# Patient Record
Sex: MALE | Race: WHITE | URBAN - METROPOLITAN AREA
[De-identification: names, ages, dates, MRNs, and addresses within clinical notes are randomized per-mention and may not be internally consistent; named-entity substitution may affect disease eponyms.]

---

## 2019-02-20 ENCOUNTER — OFFICE VISIT (OUTPATIENT)
Dept: FAMILY MEDICINE CLINIC | Facility: CLINIC | Age: 39
End: 2019-02-20
Payer: COMMERCIAL

## 2019-02-20 VITALS
BODY MASS INDEX: 30.87 KG/M2 | WEIGHT: 240.5 LBS | HEART RATE: 76 BPM | DIASTOLIC BLOOD PRESSURE: 70 MMHG | HEIGHT: 74.2 IN | TEMPERATURE: 99 F | SYSTOLIC BLOOD PRESSURE: 102 MMHG

## 2019-02-20 DIAGNOSIS — Z00.00 ROUTINE GENERAL MEDICAL EXAMINATION AT A HEALTH CARE FACILITY: Primary | ICD-10-CM

## 2019-02-20 PROCEDURE — 99386 PREV VISIT NEW AGE 40-64: CPT | Performed by: FAMILY MEDICINE

## 2019-02-20 RX ORDER — CALCIUM CARBONATE 200(500)MG
1 TABLET,CHEWABLE ORAL NIGHTLY
COMMUNITY

## 2019-02-20 NOTE — PROGRESS NOTES
Linda Shore is a 34year old male who is here for Patient presents with:   Well Adult      HPI:     Here for wellness    Takes tums nightly for about 4-5 yrs    Screening:  Diet: tries to eat healthy  Exercise: walks a lot  Sleep: normal  Depression/A cough, or congestion  CARDIOVASCULAR: denies chest pain, pressure or palpitations  GI: denies abdominal pain, constipation, diarrhea, n/v, BRBPR, melena  : no dysuria, frequency, or hematuria  SKIN: denies any unusual skin lesions or rashes  PSYCH: mood discussed the following:  Healthy diet and exercise, immunizations, and cancer screening    -Fasting labs ordered    Stress Management: counseled    GERD  -trigger avoidance  -consider zantac or prilosec x 3 months  -f/u after that if symptoms persist  -ok

## 2019-02-20 NOTE — PATIENT INSTRUCTIONS
Increase cardio  Continue to make healthy food choices  Limit alcohol to no more than 2 drinks per day    --limit caffeine, spicy foods, alcohol, acidic foods (tomatoes, pasta sauce, salsa. ..) , oily, greasy or fried food  -- find out which foods make sy

## 2019-02-21 ENCOUNTER — LAB ENCOUNTER (OUTPATIENT)
Dept: LAB | Age: 39
End: 2019-02-21
Attending: FAMILY MEDICINE
Payer: COMMERCIAL

## 2019-02-21 DIAGNOSIS — Z00.00 ROUTINE GENERAL MEDICAL EXAMINATION AT A HEALTH CARE FACILITY: ICD-10-CM

## 2019-02-21 LAB
ALBUMIN SERPL-MCNC: 4.2 G/DL (ref 3.4–5)
ALBUMIN/GLOB SERPL: 1.1 {RATIO} (ref 1–2)
ALP LIVER SERPL-CCNC: 74 U/L (ref 45–117)
ALT SERPL-CCNC: 27 U/L (ref 16–61)
ANION GAP SERPL CALC-SCNC: 6 MMOL/L (ref 0–18)
AST SERPL-CCNC: 21 U/L (ref 15–37)
BASOPHILS # BLD AUTO: 0.05 X10(3) UL (ref 0–0.2)
BASOPHILS NFR BLD AUTO: 0.9 %
BILIRUB SERPL-MCNC: 0.5 MG/DL (ref 0.1–2)
BUN BLD-MCNC: 18 MG/DL (ref 7–18)
BUN/CREAT SERPL: 16.7 (ref 10–20)
CALCIUM BLD-MCNC: 9.2 MG/DL (ref 8.5–10.1)
CHLORIDE SERPL-SCNC: 106 MMOL/L (ref 98–107)
CHOLEST SMN-MCNC: 209 MG/DL (ref ?–200)
CO2 SERPL-SCNC: 29 MMOL/L (ref 21–32)
CREAT BLD-MCNC: 1.08 MG/DL (ref 0.7–1.3)
DEPRECATED RDW RBC AUTO: 43.6 FL (ref 35.1–46.3)
EOSINOPHIL # BLD AUTO: 0.12 X10(3) UL (ref 0–0.7)
EOSINOPHIL NFR BLD AUTO: 2.1 %
ERYTHROCYTE [DISTWIDTH] IN BLOOD BY AUTOMATED COUNT: 14.6 % (ref 11–15)
GLOBULIN PLAS-MCNC: 3.7 G/DL (ref 2.8–4.4)
GLUCOSE BLD-MCNC: 81 MG/DL (ref 70–99)
HCT VFR BLD AUTO: 49.7 % (ref 39–53)
HDLC SERPL-MCNC: 31 MG/DL (ref 40–59)
HGB BLD-MCNC: 15.9 G/DL (ref 13–17.5)
IMM GRANULOCYTES # BLD AUTO: 0.04 X10(3) UL (ref 0–1)
IMM GRANULOCYTES NFR BLD: 0.7 %
LDLC SERPL CALC-MCNC: 141 MG/DL (ref ?–100)
LYMPHOCYTES # BLD AUTO: 1.92 X10(3) UL (ref 1–4)
LYMPHOCYTES NFR BLD AUTO: 33.4 %
M PROTEIN MFR SERPL ELPH: 7.9 G/DL (ref 6.4–8.2)
MCH RBC QN AUTO: 26.9 PG (ref 26–34)
MCHC RBC AUTO-ENTMCNC: 32 G/DL (ref 31–37)
MCV RBC AUTO: 84.1 FL (ref 80–100)
MONOCYTES # BLD AUTO: 0.51 X10(3) UL (ref 0.1–1)
MONOCYTES NFR BLD AUTO: 8.9 %
NEUTROPHILS # BLD AUTO: 3.1 X10 (3) UL (ref 1.5–7.7)
NEUTROPHILS # BLD AUTO: 3.1 X10(3) UL (ref 1.5–7.7)
NEUTROPHILS NFR BLD AUTO: 54 %
NONHDLC SERPL-MCNC: 178 MG/DL (ref ?–130)
OSMOLALITY SERPL CALC.SUM OF ELEC: 293 MOSM/KG (ref 275–295)
PLATELET # BLD AUTO: 268 10(3)UL (ref 150–450)
POTASSIUM SERPL-SCNC: 4.2 MMOL/L (ref 3.5–5.1)
RBC # BLD AUTO: 5.91 X10(6)UL (ref 4.3–5.7)
SODIUM SERPL-SCNC: 141 MMOL/L (ref 136–145)
TRIGL SERPL-MCNC: 183 MG/DL (ref 30–149)
TSI SER-ACNC: 1.81 MIU/ML (ref 0.36–3.74)
VLDLC SERPL CALC-MCNC: 37 MG/DL (ref 0–30)
WBC # BLD AUTO: 5.7 X10(3) UL (ref 4–11)

## 2019-02-21 PROCEDURE — 80061 LIPID PANEL: CPT | Performed by: FAMILY MEDICINE

## 2019-02-21 PROCEDURE — 36415 COLL VENOUS BLD VENIPUNCTURE: CPT | Performed by: FAMILY MEDICINE

## 2019-02-21 PROCEDURE — 80050 GENERAL HEALTH PANEL: CPT | Performed by: FAMILY MEDICINE

## 2019-05-08 ENCOUNTER — WALK IN (OUTPATIENT)
Dept: URGENT CARE | Age: 39
End: 2019-05-08

## 2019-05-08 DIAGNOSIS — J06.9 UPPER RESPIRATORY TRACT INFECTION, UNSPECIFIED TYPE: Primary | ICD-10-CM

## 2019-05-08 PROCEDURE — 99203 OFFICE O/P NEW LOW 30 MIN: CPT | Performed by: NURSE PRACTITIONER

## 2019-05-08 RX ORDER — ALBUTEROL SULFATE 90 UG/1
2 AEROSOL, METERED RESPIRATORY (INHALATION) EVERY 4 HOURS PRN
Qty: 1 INHALER | Refills: 0 | Status: SHIPPED | OUTPATIENT
Start: 2019-05-08 | End: 2022-03-10 | Stop reason: ALTCHOICE

## 2019-05-08 RX ORDER — BENZONATATE 100 MG/1
100 CAPSULE ORAL 3 TIMES DAILY PRN
Qty: 15 CAPSULE | Refills: 0 | Status: SHIPPED | OUTPATIENT
Start: 2019-05-08 | End: 2019-05-13

## 2019-05-08 RX ORDER — FLUTICASONE PROPIONATE 50 MCG
1-2 SPRAY, SUSPENSION (ML) NASAL DAILY
Qty: 16 G | Refills: 1 | Status: SHIPPED | OUTPATIENT
Start: 2019-05-08 | End: 2019-05-18

## 2019-05-08 ASSESSMENT — ENCOUNTER SYMPTOMS
SINUS PRESSURE: 1
FEVER: 0
VOMITING: 0
SINUS PAIN: 0
NAUSEA: 1
COUGH: 1
SWOLLEN GLANDS: 0
WHEEZING: 0
FATIGUE: 1
SORE THROAT: 1

## 2019-05-08 ASSESSMENT — PAIN SCALES - GENERAL: PAINLEVEL: 0

## 2020-02-03 ENCOUNTER — HOSPITAL ENCOUNTER (OUTPATIENT)
Dept: ULTRASOUND IMAGING | Age: 40
Discharge: HOME | End: 2020-02-03
Attending: FAMILY MEDICINE
Payer: COMMERCIAL

## 2020-02-03 ENCOUNTER — OFFICE VISIT (OUTPATIENT)
Dept: FAMILY MEDICINE CLINIC | Facility: CLINIC | Age: 40
End: 2020-02-03
Payer: COMMERCIAL

## 2020-02-03 ENCOUNTER — LAB ENCOUNTER (OUTPATIENT)
Dept: LAB | Age: 40
End: 2020-02-03
Attending: FAMILY MEDICINE
Payer: COMMERCIAL

## 2020-02-03 VITALS
DIASTOLIC BLOOD PRESSURE: 60 MMHG | SYSTOLIC BLOOD PRESSURE: 110 MMHG | HEART RATE: 62 BPM | TEMPERATURE: 99 F | OXYGEN SATURATION: 97 % | BODY MASS INDEX: 31.06 KG/M2 | WEIGHT: 242 LBS | HEIGHT: 74.2 IN

## 2020-02-03 DIAGNOSIS — L72.9 CYST OF SKIN: ICD-10-CM

## 2020-02-03 DIAGNOSIS — Z00.00 ROUTINE GENERAL MEDICAL EXAMINATION AT A HEALTH CARE FACILITY: ICD-10-CM

## 2020-02-03 DIAGNOSIS — D22.9 ATYPICAL MOLE: ICD-10-CM

## 2020-02-03 DIAGNOSIS — Z00.00 ROUTINE GENERAL MEDICAL EXAMINATION AT A HEALTH CARE FACILITY: Primary | ICD-10-CM

## 2020-02-03 LAB
ALBUMIN SERPL-MCNC: 4 G/DL (ref 3.4–5)
ALBUMIN/GLOB SERPL: 1.1 {RATIO} (ref 1–2)
ALP LIVER SERPL-CCNC: 90 U/L (ref 45–117)
ALT SERPL-CCNC: 44 U/L (ref 16–61)
ANION GAP SERPL CALC-SCNC: 1 MMOL/L (ref 0–18)
AST SERPL-CCNC: 21 U/L (ref 15–37)
BASOPHILS # BLD AUTO: 0.06 X10(3) UL (ref 0–0.2)
BASOPHILS NFR BLD AUTO: 1.2 %
BILIRUB SERPL-MCNC: 0.3 MG/DL (ref 0.1–2)
BUN BLD-MCNC: 14 MG/DL (ref 7–18)
BUN/CREAT SERPL: 14 (ref 10–20)
CALCIUM BLD-MCNC: 9.6 MG/DL (ref 8.5–10.1)
CHLORIDE SERPL-SCNC: 105 MMOL/L (ref 98–112)
CHOLEST SMN-MCNC: 204 MG/DL (ref ?–200)
CO2 SERPL-SCNC: 31 MMOL/L (ref 21–32)
CREAT BLD-MCNC: 1 MG/DL (ref 0.7–1.3)
DEPRECATED RDW RBC AUTO: 41.4 FL (ref 35.1–46.3)
EOSINOPHIL # BLD AUTO: 0.14 X10(3) UL (ref 0–0.7)
EOSINOPHIL NFR BLD AUTO: 2.7 %
ERYTHROCYTE [DISTWIDTH] IN BLOOD BY AUTOMATED COUNT: 13.5 % (ref 11–15)
GLOBULIN PLAS-MCNC: 3.7 G/DL (ref 2.8–4.4)
GLUCOSE BLD-MCNC: 101 MG/DL (ref 70–99)
HCT VFR BLD AUTO: 47.6 % (ref 39–53)
HDLC SERPL-MCNC: 35 MG/DL (ref 40–59)
HGB BLD-MCNC: 14.8 G/DL (ref 13–17.5)
IMM GRANULOCYTES # BLD AUTO: 0.03 X10(3) UL (ref 0–1)
IMM GRANULOCYTES NFR BLD: 0.6 %
LDLC SERPL CALC-MCNC: 132 MG/DL (ref ?–100)
LYMPHOCYTES # BLD AUTO: 1.7 X10(3) UL (ref 1–4)
LYMPHOCYTES NFR BLD AUTO: 33.1 %
M PROTEIN MFR SERPL ELPH: 7.7 G/DL (ref 6.4–8.2)
MCH RBC QN AUTO: 26.3 PG (ref 26–34)
MCHC RBC AUTO-ENTMCNC: 31.1 G/DL (ref 31–37)
MCV RBC AUTO: 84.5 FL (ref 80–100)
MONOCYTES # BLD AUTO: 0.52 X10(3) UL (ref 0.1–1)
MONOCYTES NFR BLD AUTO: 10.1 %
NEUTROPHILS # BLD AUTO: 2.68 X10 (3) UL (ref 1.5–7.7)
NEUTROPHILS # BLD AUTO: 2.68 X10(3) UL (ref 1.5–7.7)
NEUTROPHILS NFR BLD AUTO: 52.3 %
NONHDLC SERPL-MCNC: 169 MG/DL (ref ?–130)
OSMOLALITY SERPL CALC.SUM OF ELEC: 285 MOSM/KG (ref 275–295)
PATIENT FASTING Y/N/NP: YES
PATIENT FASTING Y/N/NP: YES
PLATELET # BLD AUTO: 289 10(3)UL (ref 150–450)
POTASSIUM SERPL-SCNC: 4.5 MMOL/L (ref 3.5–5.1)
RBC # BLD AUTO: 5.63 X10(6)UL (ref 4.3–5.7)
SODIUM SERPL-SCNC: 137 MMOL/L (ref 136–145)
TRIGL SERPL-MCNC: 185 MG/DL (ref 30–149)
TSI SER-ACNC: 1.38 MIU/ML (ref 0.36–3.74)
VLDLC SERPL CALC-MCNC: 37 MG/DL (ref 0–30)
WBC # BLD AUTO: 5.1 X10(3) UL (ref 4–11)

## 2020-02-03 PROCEDURE — 80053 COMPREHEN METABOLIC PANEL: CPT

## 2020-02-03 PROCEDURE — 80061 LIPID PANEL: CPT

## 2020-02-03 PROCEDURE — 36415 COLL VENOUS BLD VENIPUNCTURE: CPT

## 2020-02-03 PROCEDURE — 76536 US EXAM OF HEAD AND NECK: CPT | Performed by: FAMILY MEDICINE

## 2020-02-03 PROCEDURE — 84443 ASSAY THYROID STIM HORMONE: CPT

## 2020-02-03 PROCEDURE — 99396 PREV VISIT EST AGE 40-64: CPT | Performed by: FAMILY MEDICINE

## 2020-02-03 PROCEDURE — 85025 COMPLETE CBC W/AUTO DIFF WBC: CPT

## 2020-02-03 NOTE — PROGRESS NOTES
Sparkle Peters is a 37year old male who is here for Patient presents with:  Wellness Visit: Lump on neck x 1 year. no pain and is growing with time      HPI:     1.  Routine general medical examination at a health care facility  -due    Cyst on back of denies abdominal pain, constipation, diarrhea, n/v, BRBPR, melena  : no dysuria, frequency, or hematuria  SKIN: denies any unusual skin lesions or rashes  PSYCH: mood is stable  EXT: denies edema     Wt Readings from Last 6 Encounters:  02/03/20 : 242 lb Systolic Blood Pressure: 920 mmHg      Is BP treated: No      HDL Cholesterol: 31 mg/dL      Total Cholesterol: 209 mg/dL    ASSESSMENT AND PLAN:     Health Maintenance  -We discussed the following:  Healthy diet and exercise, immunizations, and cancer s

## 2020-02-03 NOTE — PATIENT INSTRUCTIONS
Call to schedule ultrasound of neck    Work on healthy diet and exercise    We will call with lab results    Followup for mole removal as your convenience    Followup in 1 yr, sooner if needed

## 2020-09-15 ENCOUNTER — OFFICE VISIT (OUTPATIENT)
Dept: FAMILY MEDICINE CLINIC | Facility: CLINIC | Age: 40
End: 2020-09-15
Payer: COMMERCIAL

## 2020-09-15 VITALS
HEART RATE: 76 BPM | BODY MASS INDEX: 29 KG/M2 | OXYGEN SATURATION: 96 % | DIASTOLIC BLOOD PRESSURE: 72 MMHG | TEMPERATURE: 98 F | WEIGHT: 227.81 LBS | SYSTOLIC BLOOD PRESSURE: 122 MMHG

## 2020-09-15 DIAGNOSIS — Z23 FLU VACCINE NEED: ICD-10-CM

## 2020-09-15 DIAGNOSIS — D22.9 MULTIPLE ATYPICAL SKIN MOLES: ICD-10-CM

## 2020-09-15 DIAGNOSIS — L72.9 CYST OF SKIN: Primary | ICD-10-CM

## 2020-09-15 PROCEDURE — 3078F DIAST BP <80 MM HG: CPT | Performed by: FAMILY MEDICINE

## 2020-09-15 PROCEDURE — 3074F SYST BP LT 130 MM HG: CPT | Performed by: FAMILY MEDICINE

## 2020-09-15 PROCEDURE — 99213 OFFICE O/P EST LOW 20 MIN: CPT | Performed by: FAMILY MEDICINE

## 2020-09-15 PROCEDURE — 88305 TISSUE EXAM BY PATHOLOGIST: CPT | Performed by: FAMILY MEDICINE

## 2020-09-15 PROCEDURE — 11302 SHAVE SKIN LESION 1.1-2.0 CM: CPT | Performed by: FAMILY MEDICINE

## 2020-09-15 PROCEDURE — 90686 IIV4 VACC NO PRSV 0.5 ML IM: CPT | Performed by: FAMILY MEDICINE

## 2020-09-15 PROCEDURE — 90471 IMMUNIZATION ADMIN: CPT | Performed by: FAMILY MEDICINE

## 2020-09-15 NOTE — PROGRESS NOTES
Kandis Juarez is a 41year old male here for Patient presents with:  Mole Removal: Patient has moles on his lower back. HPI:       1.  Cyst of skin  -in neck  -has been growing in size slightly  -denies pain or redness  -US showed most likely a cyst affect  Skin: cyst in neck     ASSESSMENT/PLAN:     1.  Cyst of skin  -counseled on likely etiologies  -referred to gen surg for further eval/input    - SURGERY - INTERNAL    2. Multiple atypical skin moles  -removed today  -precautions given - see procedur

## 2020-09-15 NOTE — PATIENT INSTRUCTIONS
Antibiotic ointment with bandaid change daily    Would expect slow improvement and healing    If worsening, let me know    Call to schedule appt with surgeon to review cyst    Followup in Feb/March for wellness, sooner if needed

## 2020-11-17 ENCOUNTER — OFFICE VISIT (OUTPATIENT)
Dept: SURGERY | Facility: CLINIC | Age: 40
End: 2020-11-17
Payer: COMMERCIAL

## 2020-11-17 VITALS — DIASTOLIC BLOOD PRESSURE: 74 MMHG | HEART RATE: 72 BPM | SYSTOLIC BLOOD PRESSURE: 125 MMHG | TEMPERATURE: 98 F

## 2020-11-17 DIAGNOSIS — L08.9 INFECTED SEBACEOUS CYST: Primary | ICD-10-CM

## 2020-11-17 DIAGNOSIS — Z01.818 PRE-OP TESTING: ICD-10-CM

## 2020-11-17 DIAGNOSIS — L72.3 INFECTED SEBACEOUS CYST: Primary | ICD-10-CM

## 2020-11-17 PROCEDURE — 3074F SYST BP LT 130 MM HG: CPT | Performed by: COLON & RECTAL SURGERY

## 2020-11-17 PROCEDURE — 3078F DIAST BP <80 MM HG: CPT | Performed by: COLON & RECTAL SURGERY

## 2020-11-17 PROCEDURE — 99203 OFFICE O/P NEW LOW 30 MIN: CPT | Performed by: COLON & RECTAL SURGERY

## 2020-11-17 RX ORDER — CEFADROXIL 500 MG/1
500 CAPSULE ORAL 2 TIMES DAILY
Qty: 28 CAPSULE | Refills: 0 | Status: SHIPPED | OUTPATIENT
Start: 2020-11-17 | End: 2020-12-01

## 2020-11-17 NOTE — H&P
New Patient Visit Note       Active Problems      1. Infected sebaceous cyst        Chief Complaint   Patient presents with:  Cyst: NP ref by PCP for cyst back of neck -- States pain 6-7/10 anytime while moving the area.  States had for about 9 months and j the superficial/subcutaneous tissues is a small circumscribed hypoechoic nodule. Through transmission is seen.   Size 0.6 x   0.5 x 0.6 cm.      =====  CONCLUSION:  At the site of palpable abnormality is a small subcutaneous nodule, most consistent with a Review of Systems  The Review of Systems has been reviewed by me during today. Review of Systems   Constitutional: Negative for chills, diaphoresis, fatigue, fever and unexpected weight change.    HENT: Negative for hearing loss, nosebleeds, sore throat ultrasound of the region on February 3, 2020, at [facility], revealing a well-circumscribed cystic lesion with consistency of a sebaceous cyst.    The patient states that since that time the lesion has gotten bigger. It is feeling uncomfortable.   He and a potential two-stage procedure. All risks, benefits, complications and alternatives to the proposed operation were fully discussed with the patient. All questions from the patient were answered in detail.  A description of the procedure and it's pos

## 2020-11-17 NOTE — PATIENT INSTRUCTIONS
This patient noticed a lump on the back of his neck on the right side in February of this year.   The patient underwent ultrasound of the region on February 3, 2020, at [facility], revealing a well-circumscribed cystic lesion with consistency of a seba less chance of infection in the future, and less chance of spontaneous rupture requiring further incision and drainage and a potential two-stage procedure.     All risks, benefits, complications and alternatives to the proposed operation were fully discusse

## 2020-11-19 ENCOUNTER — TELEPHONE (OUTPATIENT)
Dept: SURGERY | Facility: CLINIC | Age: 40
End: 2020-11-19

## 2020-11-19 DIAGNOSIS — L72.3 SEBACEOUS CYST: Primary | ICD-10-CM

## 2020-11-22 ENCOUNTER — APPOINTMENT (OUTPATIENT)
Dept: LAB | Age: 40
End: 2020-11-22
Attending: COLON & RECTAL SURGERY
Payer: COMMERCIAL

## 2020-11-22 DIAGNOSIS — Z01.818 PRE-OP TESTING: ICD-10-CM

## 2020-11-25 ENCOUNTER — LAB REQUISITION (OUTPATIENT)
Dept: LAB | Facility: HOSPITAL | Age: 40
End: 2020-11-25
Payer: COMMERCIAL

## 2020-11-25 DIAGNOSIS — L72.3 SEBACEOUS CYST: ICD-10-CM

## 2020-11-25 PROCEDURE — 88304 TISSUE EXAM BY PATHOLOGIST: CPT | Performed by: COLON & RECTAL SURGERY

## 2020-12-03 ENCOUNTER — OFFICE VISIT (OUTPATIENT)
Dept: SURGERY | Facility: CLINIC | Age: 40
End: 2020-12-03

## 2020-12-03 VITALS
DIASTOLIC BLOOD PRESSURE: 73 MMHG | TEMPERATURE: 97 F | BODY MASS INDEX: 28.6 KG/M2 | HEIGHT: 75 IN | HEART RATE: 73 BPM | WEIGHT: 230 LBS | SYSTOLIC BLOOD PRESSURE: 102 MMHG

## 2020-12-03 DIAGNOSIS — L72.3 INFECTED SEBACEOUS CYST: Primary | ICD-10-CM

## 2020-12-03 DIAGNOSIS — L08.9 INFECTED SEBACEOUS CYST: Primary | ICD-10-CM

## 2020-12-03 PROCEDURE — 3008F BODY MASS INDEX DOCD: CPT | Performed by: COLON & RECTAL SURGERY

## 2020-12-03 PROCEDURE — 99024 POSTOP FOLLOW-UP VISIT: CPT | Performed by: COLON & RECTAL SURGERY

## 2020-12-03 PROCEDURE — 3074F SYST BP LT 130 MM HG: CPT | Performed by: COLON & RECTAL SURGERY

## 2020-12-03 PROCEDURE — 3078F DIAST BP <80 MM HG: CPT | Performed by: COLON & RECTAL SURGERY

## 2020-12-03 NOTE — PATIENT INSTRUCTIONS
This patient underwent an uncomplicated excision of a ruptured epidermal inclusion cyst on the right posterior lateral neck. The procedure was performed on November 25, 2020.     Final pathology report does reveal this to be an ruptured epidermal inclusion

## 2020-12-03 NOTE — PROGRESS NOTES
Post Operative Visit Note       Active Problems  1. Infected sebaceous cyst         Chief Complaint   Patient presents with:  Post-Op: POST OP- EXCISION OF JENNIFER CYST ON BACK OF POSTERIOR NECK W/ DJP ON 11/25. Denies fevers. Denies pain.  Denies drainage, swe past surgical history have been reviewed by me today. No past medical history on file.   Past Surgical History:   Procedure Laterality Date   • OTHER      - EXCISION OF SEB CYST ON BACK OF POSTERIOR NECK    • REPAIR ROTATOR CUFF,ACUTE Left 2014 Th and rash. Neurological: Negative for tremors, syncope and weakness. Hematological: Negative for adenopathy. Does not bruise/bleed easily. Psychiatric/Behavioral: Negative for behavioral problems and sleep disturbance.        Physical Findings

## 2020-12-08 ENCOUNTER — OFFICE VISIT (OUTPATIENT)
Dept: SURGERY | Facility: CLINIC | Age: 40
End: 2020-12-08

## 2020-12-08 VITALS — DIASTOLIC BLOOD PRESSURE: 75 MMHG | SYSTOLIC BLOOD PRESSURE: 121 MMHG | HEART RATE: 70 BPM | TEMPERATURE: 98 F

## 2020-12-08 DIAGNOSIS — L72.3 INFECTED SEBACEOUS CYST: Primary | ICD-10-CM

## 2020-12-08 DIAGNOSIS — L08.9 INFECTED SEBACEOUS CYST: Primary | ICD-10-CM

## 2020-12-08 PROCEDURE — 99024 POSTOP FOLLOW-UP VISIT: CPT | Performed by: PHYSICIAN ASSISTANT

## 2020-12-08 PROCEDURE — 3078F DIAST BP <80 MM HG: CPT | Performed by: PHYSICIAN ASSISTANT

## 2020-12-08 PROCEDURE — 3074F SYST BP LT 130 MM HG: CPT | Performed by: PHYSICIAN ASSISTANT

## 2020-12-08 NOTE — PROGRESS NOTES
Post Operative Visit Note       Active Problems  1. Infected sebaceous cyst         Chief Complaint   Patient presents with:  Cyst:  EXCISION OF SEB CYST ON BACK OF POSTERIOR NECK W/ DJP ON 11/25 - stitch removal -- States discomfort at times.  Denies drain Chew Tab, Chew 1 tablet by mouth nightly., Disp: , Rfl:       Review of Systems  The Review of Systems has been reviewed by me during today. Review of Systems   Constitutional: Negative for chills, diaphoresis, fatigue, fever and unexpected weight change. understanding agreement with plan of care. I have no further follow-up scheduled with this patient at this time. This patient can see me or Dr. Lorenza Miller on an as-needed basis.   This patient should return urgently for any problems or complications related

## 2020-12-08 NOTE — PATIENT INSTRUCTIONS
The patient presents today for continued care and evaluation following excision of a ruptured epidermoid cyst on November 25, 2020. The patient states that he is doing well since his last visit. He states he has no new complaints at this time.   Denies

## 2021-03-15 DIAGNOSIS — Z23 NEED FOR VACCINATION: ICD-10-CM

## 2021-03-17 ENCOUNTER — IMMUNIZATION (OUTPATIENT)
Dept: LAB | Facility: HOSPITAL | Age: 41
End: 2021-03-17
Attending: HOSPITALIST
Payer: COMMERCIAL

## 2021-03-17 DIAGNOSIS — Z23 NEED FOR VACCINATION: Primary | ICD-10-CM

## 2021-03-17 PROCEDURE — 0011A SARSCOV2 VAC 100MCG/0.5ML IM: CPT

## 2021-04-14 ENCOUNTER — IMMUNIZATION (OUTPATIENT)
Dept: LAB | Facility: HOSPITAL | Age: 41
End: 2021-04-14
Attending: EMERGENCY MEDICINE
Payer: COMMERCIAL

## 2021-04-14 DIAGNOSIS — Z23 NEED FOR VACCINATION: Primary | ICD-10-CM

## 2021-04-14 PROCEDURE — 0012A SARSCOV2 VAC 100MCG/0.5ML IM: CPT

## 2021-05-26 VITALS
WEIGHT: 240 LBS | RESPIRATION RATE: 16 BRPM | HEART RATE: 84 BPM | HEIGHT: 74 IN | BODY MASS INDEX: 30.8 KG/M2 | SYSTOLIC BLOOD PRESSURE: 130 MMHG | DIASTOLIC BLOOD PRESSURE: 72 MMHG | TEMPERATURE: 98.2 F | OXYGEN SATURATION: 98 %

## 2022-03-10 ENCOUNTER — WALK IN (OUTPATIENT)
Dept: URGENT CARE | Age: 42
End: 2022-03-10

## 2022-03-10 VITALS
TEMPERATURE: 98 F | SYSTOLIC BLOOD PRESSURE: 122 MMHG | HEIGHT: 75 IN | WEIGHT: 240 LBS | DIASTOLIC BLOOD PRESSURE: 62 MMHG | HEART RATE: 75 BPM | BODY MASS INDEX: 29.84 KG/M2 | OXYGEN SATURATION: 97 %

## 2022-03-10 DIAGNOSIS — J06.9 UPPER RESPIRATORY TRACT INFECTION, UNSPECIFIED TYPE: Primary | ICD-10-CM

## 2022-03-10 DIAGNOSIS — J02.9 SORE THROAT: ICD-10-CM

## 2022-03-10 LAB
INTERNAL PROCEDURAL CONTROLS ACCEPTABLE: YES
S PYO AG THROAT QL IA.RAPID: NEGATIVE

## 2022-03-10 PROCEDURE — U0003 INFECTIOUS AGENT DETECTION BY NUCLEIC ACID (DNA OR RNA); SEVERE ACUTE RESPIRATORY SYNDROME CORONAVIRUS 2 (SARS-COV-2) (CORONAVIRUS DISEASE [COVID-19]), AMPLIFIED PROBE TECHNIQUE, MAKING USE OF HIGH THROUGHPUT TECHNOLOGIES AS DESCRIBED BY CMS-2020-01-R: HCPCS | Performed by: PSYCHIATRY & NEUROLOGY

## 2022-03-10 PROCEDURE — 87880 STREP A ASSAY W/OPTIC: CPT | Performed by: NURSE PRACTITIONER

## 2022-03-10 PROCEDURE — 99214 OFFICE O/P EST MOD 30 MIN: CPT | Performed by: NURSE PRACTITIONER

## 2022-03-10 PROCEDURE — U0005 INFEC AGEN DETEC AMPLI PROBE: HCPCS | Performed by: PSYCHIATRY & NEUROLOGY

## 2022-03-10 RX ORDER — UREA 10 %
1 LOTION (ML) TOPICAL NIGHTLY
COMMUNITY
End: 2023-06-12 | Stop reason: ALTCHOICE

## 2022-03-10 RX ORDER — FLUTICASONE PROPIONATE 50 MCG
2 SPRAY, SUSPENSION (ML) NASAL DAILY
Qty: 16 G | Refills: 1 | COMMUNITY
Start: 2022-03-10 | End: 2022-03-20

## 2022-03-10 ASSESSMENT — ENCOUNTER SYMPTOMS
SINUS PAIN: 0
COUGH: 1
SHORTNESS OF BREATH: 0
FEVER: 0
SORE THROAT: 1
RHINORRHEA: 1
VOMITING: 0
CHILLS: 0
SWOLLEN GLANDS: 0
WHEEZING: 0
HEADACHES: 1
DIARRHEA: 0
EYE DISCHARGE: 0
EYE REDNESS: 0
SINUS PRESSURE: 0

## 2022-03-10 ASSESSMENT — PAIN SCALES - GENERAL: PAINLEVEL: 3

## 2022-03-11 ENCOUNTER — TELEPHONE (OUTPATIENT)
Dept: URGENT CARE | Age: 42
End: 2022-03-11

## 2022-03-11 LAB
SARS-COV-2 RNA RESP QL NAA+PROBE: NOT DETECTED
SERVICE CMNT-IMP: NORMAL
SERVICE CMNT-IMP: NORMAL

## 2022-03-15 ENCOUNTER — TELEMEDICINE (OUTPATIENT)
Dept: FAMILY MEDICINE CLINIC | Facility: CLINIC | Age: 42
End: 2022-03-15
Payer: COMMERCIAL

## 2022-03-15 DIAGNOSIS — K21.9 CHRONIC GERD: Primary | ICD-10-CM

## 2022-03-15 PROCEDURE — 99214 OFFICE O/P EST MOD 30 MIN: CPT | Performed by: FAMILY MEDICINE

## 2022-03-15 RX ORDER — NICOTINE POLACRILEX 4 MG/1
20 GUM, CHEWING ORAL
Qty: 180 TABLET | Refills: 1 | Status: SHIPPED | OUTPATIENT
Start: 2022-03-15 | End: 2022-03-25

## 2022-03-15 RX ORDER — MULTIVIT,TX WITH IRON,MINERALS
1 TABLET, EXTENDED RELEASE ORAL DAILY
COMMUNITY

## 2022-03-15 RX ORDER — CALCIUM CITRATE/VITAMIN D3 200MG-6.25
2 TABLET ORAL DAILY
COMMUNITY

## 2022-03-15 RX ORDER — FAMOTIDINE 20 MG/1
20 TABLET, FILM COATED ORAL 2 TIMES DAILY PRN
COMMUNITY
End: 2022-03-29

## 2022-03-25 ENCOUNTER — TELEPHONE (OUTPATIENT)
Dept: FAMILY MEDICINE CLINIC | Facility: CLINIC | Age: 42
End: 2022-03-25

## 2022-03-25 ENCOUNTER — OFFICE VISIT (OUTPATIENT)
Dept: FAMILY MEDICINE CLINIC | Facility: CLINIC | Age: 42
End: 2022-03-25
Payer: COMMERCIAL

## 2022-03-25 VITALS
SYSTOLIC BLOOD PRESSURE: 124 MMHG | OXYGEN SATURATION: 100 % | HEIGHT: 74.8 IN | TEMPERATURE: 98 F | DIASTOLIC BLOOD PRESSURE: 82 MMHG | BODY MASS INDEX: 30.53 KG/M2 | HEART RATE: 80 BPM | RESPIRATION RATE: 16 BRPM | WEIGHT: 243 LBS

## 2022-03-25 DIAGNOSIS — F41.9 ANXIETY: ICD-10-CM

## 2022-03-25 DIAGNOSIS — K21.9 CHRONIC GERD: Primary | ICD-10-CM

## 2022-03-25 DIAGNOSIS — E78.2 MIXED HYPERLIPIDEMIA: ICD-10-CM

## 2022-03-25 DIAGNOSIS — Z13.29 SCREENING FOR THYROID DISORDER: ICD-10-CM

## 2022-03-25 LAB
ALBUMIN SERPL-MCNC: 4.3 G/DL (ref 3.4–5)
ALBUMIN/GLOB SERPL: 1.3 {RATIO} (ref 1–2)
ALP LIVER SERPL-CCNC: 90 U/L
ALT SERPL-CCNC: 35 U/L
ANION GAP SERPL CALC-SCNC: 7 MMOL/L (ref 0–18)
AST SERPL-CCNC: 21 U/L (ref 15–37)
BASOPHILS # BLD AUTO: 0.04 X10(3) UL (ref 0–0.2)
BASOPHILS NFR BLD AUTO: 0.7 %
BILIRUB SERPL-MCNC: 0.4 MG/DL (ref 0.1–2)
BUN BLD-MCNC: 9 MG/DL (ref 7–18)
CALCIUM BLD-MCNC: 9.4 MG/DL (ref 8.5–10.1)
CHLORIDE SERPL-SCNC: 109 MMOL/L (ref 98–112)
CHOLEST SERPL-MCNC: 181 MG/DL (ref ?–200)
CO2 SERPL-SCNC: 27 MMOL/L (ref 21–32)
CREAT BLD-MCNC: 0.94 MG/DL
EOSINOPHIL # BLD AUTO: 0.05 X10(3) UL (ref 0–0.7)
EOSINOPHIL NFR BLD AUTO: 0.9 %
ERYTHROCYTE [DISTWIDTH] IN BLOOD BY AUTOMATED COUNT: 13.2 %
FASTING PATIENT LIPID ANSWER: YES
FASTING STATUS PATIENT QL REPORTED: YES
GLOBULIN PLAS-MCNC: 3.2 G/DL (ref 2.8–4.4)
GLUCOSE BLD-MCNC: 97 MG/DL (ref 70–99)
HCT VFR BLD AUTO: 46.8 %
HGB BLD-MCNC: 15.1 G/DL
IMM GRANULOCYTES # BLD AUTO: 0.02 X10(3) UL (ref 0–1)
IMM GRANULOCYTES NFR BLD: 0.3 %
LDLC SERPL CALC-MCNC: 121 MG/DL (ref ?–100)
LYMPHOCYTES # BLD AUTO: 1.32 X10(3) UL (ref 1–4)
LYMPHOCYTES NFR BLD AUTO: 22.6 %
MCH RBC QN AUTO: 26.7 PG (ref 26–34)
MCHC RBC AUTO-ENTMCNC: 32.3 G/DL (ref 31–37)
MCV RBC AUTO: 82.7 FL
MONOCYTES # BLD AUTO: 0.39 X10(3) UL (ref 0.1–1)
MONOCYTES NFR BLD AUTO: 6.7 %
NEUTROPHILS # BLD AUTO: 4.01 X10 (3) UL (ref 1.5–7.7)
NEUTROPHILS # BLD AUTO: 4.01 X10(3) UL (ref 1.5–7.7)
NEUTROPHILS NFR BLD AUTO: 68.8 %
NONHDLC SERPL-MCNC: 150 MG/DL (ref ?–130)
OSMOLALITY SERPL CALC.SUM OF ELEC: 295 MOSM/KG (ref 275–295)
PLATELET # BLD AUTO: 295 10(3)UL (ref 150–450)
POTASSIUM SERPL-SCNC: 3.8 MMOL/L (ref 3.5–5.1)
PROT SERPL-MCNC: 7.5 G/DL (ref 6.4–8.2)
RBC # BLD AUTO: 5.66 X10(6)UL
SODIUM SERPL-SCNC: 143 MMOL/L (ref 136–145)
TRIGL SERPL-MCNC: 164 MG/DL (ref 30–149)
TSI SER-ACNC: 1.39 MIU/ML (ref 0.36–3.74)
VLDLC SERPL CALC-MCNC: 29 MG/DL (ref 0–30)
WBC # BLD AUTO: 5.8 X10(3) UL (ref 4–11)

## 2022-03-25 PROCEDURE — 3074F SYST BP LT 130 MM HG: CPT | Performed by: FAMILY MEDICINE

## 2022-03-25 PROCEDURE — 99214 OFFICE O/P EST MOD 30 MIN: CPT | Performed by: FAMILY MEDICINE

## 2022-03-25 PROCEDURE — 80061 LIPID PANEL: CPT | Performed by: FAMILY MEDICINE

## 2022-03-25 PROCEDURE — 3079F DIAST BP 80-89 MM HG: CPT | Performed by: FAMILY MEDICINE

## 2022-03-25 PROCEDURE — 84443 ASSAY THYROID STIM HORMONE: CPT | Performed by: FAMILY MEDICINE

## 2022-03-25 PROCEDURE — 80053 COMPREHEN METABOLIC PANEL: CPT | Performed by: FAMILY MEDICINE

## 2022-03-25 PROCEDURE — 85025 COMPLETE CBC W/AUTO DIFF WBC: CPT | Performed by: FAMILY MEDICINE

## 2022-03-25 PROCEDURE — 3008F BODY MASS INDEX DOCD: CPT | Performed by: FAMILY MEDICINE

## 2022-03-25 PROCEDURE — 36415 COLL VENOUS BLD VENIPUNCTURE: CPT | Performed by: FAMILY MEDICINE

## 2022-03-25 RX ORDER — OMEPRAZOLE 20 MG/1
20 CAPSULE, DELAYED RELEASE ORAL
COMMUNITY
Start: 2022-03-15 | End: 2022-03-29

## 2022-03-25 RX ORDER — FLUTICASONE PROPIONATE 50 MCG
2 SPRAY, SUSPENSION (ML) NASAL DAILY
COMMUNITY
Start: 2022-03-10

## 2022-03-25 RX ORDER — MAGNESIUM OXIDE 400 MG (241.3 MG MAGNESIUM) TABLET
1 TABLET NIGHTLY
COMMUNITY

## 2022-03-25 NOTE — PATIENT INSTRUCTIONS
Continue the Omeprazole 20 mg twice daily. You may use liquid Maalox or Mylanta as needed for worsening heart burn or throat pain. Avoid alcohol, tobacco, caffeine, mints, chewing gum. Avoid Ibuprofen, Advil, Motrin, Aleve, Naproxen and Aspirin. Avoid spicy, greasy foods. Do not lay down for at least 2-3 hours after eating your last meal.  Take your medications as directed. Go to the closest Emergency Department if you develop persistent vomiting, blood in the vomit, worsening abdominal pain, blood in the stool or black tarry stools. Keep your appointment with the GI doctor as scheduled on 4/5/22.

## 2022-03-25 NOTE — TELEPHONE ENCOUNTER
Pt called asking if he can speak with Dr. Angelina Franco and I told him he is out of the office. He asked if he can speak to a nurse.

## 2022-03-25 NOTE — PROGRESS NOTES
Patient came in for draw of ordered fasting labs. Patient drawn out of L AC, x 1 attempt and tolerated well.  1 Gold & 1 Lav tube drawn.

## 2022-03-25 NOTE — TELEPHONE ENCOUNTER
Spoke to patient. He had a virtual with Dr. Yeimy Pena on 3/15/22 for GERD s/s. Patient is very nervous because he has had an occasional streak of blood in his saliva. He is supposed to leave for vacation on Monday and is not sure what to do. He does have appointment with GI scheduled for 4/5/22. Appointment scheduled with Dr. Chris Damon for today.

## 2022-03-26 ENCOUNTER — APPOINTMENT (OUTPATIENT)
Dept: GENERAL RADIOLOGY | Facility: HOSPITAL | Age: 42
End: 2022-03-26
Attending: EMERGENCY MEDICINE
Payer: COMMERCIAL

## 2022-03-26 ENCOUNTER — HOSPITAL ENCOUNTER (EMERGENCY)
Facility: HOSPITAL | Age: 42
Discharge: HOME | End: 2022-03-26
Attending: EMERGENCY MEDICINE
Payer: COMMERCIAL

## 2022-03-26 VITALS
RESPIRATION RATE: 17 BRPM | SYSTOLIC BLOOD PRESSURE: 126 MMHG | BODY MASS INDEX: 32.2 KG/M2 | WEIGHT: 242.94 LBS | DIASTOLIC BLOOD PRESSURE: 70 MMHG | HEIGHT: 73 IN | OXYGEN SATURATION: 99 % | TEMPERATURE: 97 F | HEART RATE: 72 BPM

## 2022-03-26 DIAGNOSIS — K92.0 HEMATEMESIS WITH NAUSEA: Primary | ICD-10-CM

## 2022-03-26 LAB
ALBUMIN SERPL-MCNC: 4.3 G/DL (ref 3.4–5)
ALBUMIN/GLOB SERPL: 1.2 {RATIO} (ref 1–2)
ALP LIVER SERPL-CCNC: 92 U/L
ALT SERPL-CCNC: 35 U/L
ANION GAP SERPL CALC-SCNC: 4 MMOL/L (ref 0–18)
AST SERPL-CCNC: 26 U/L (ref 15–37)
BASOPHILS # BLD AUTO: 0.03 X10(3) UL (ref 0–0.2)
BASOPHILS NFR BLD AUTO: 0.5 %
BILIRUB SERPL-MCNC: 0.4 MG/DL (ref 0.1–2)
BUN BLD-MCNC: 8 MG/DL (ref 7–18)
CALCIUM BLD-MCNC: 9.4 MG/DL (ref 8.5–10.1)
CO2 SERPL-SCNC: 28 MMOL/L (ref 21–32)
CREAT BLD-MCNC: 1.07 MG/DL
EOSINOPHIL # BLD AUTO: 0.07 X10(3) UL (ref 0–0.7)
EOSINOPHIL NFR BLD AUTO: 1.2 %
ERYTHROCYTE [DISTWIDTH] IN BLOOD BY AUTOMATED COUNT: 13.5 %
GLOBULIN PLAS-MCNC: 3.7 G/DL (ref 2.8–4.4)
GLUCOSE BLD-MCNC: 101 MG/DL (ref 70–99)
HCT VFR BLD AUTO: 47.3 %
HGB BLD-MCNC: 15.3 G/DL
IMM GRANULOCYTES # BLD AUTO: 0.02 X10(3) UL (ref 0–1)
IMM GRANULOCYTES NFR BLD: 0.3 %
LYMPHOCYTES # BLD AUTO: 1.34 X10(3) UL (ref 1–4)
LYMPHOCYTES NFR BLD AUTO: 22.3 %
MCH RBC QN AUTO: 26.3 PG (ref 26–34)
MCHC RBC AUTO-ENTMCNC: 32.3 G/DL (ref 31–37)
MCV RBC AUTO: 81.3 FL
MONOCYTES # BLD AUTO: 0.46 X10(3) UL (ref 0.1–1)
MONOCYTES NFR BLD AUTO: 7.6 %
NEUTROPHILS # BLD AUTO: 4.1 X10 (3) UL (ref 1.5–7.7)
NEUTROPHILS # BLD AUTO: 4.1 X10(3) UL (ref 1.5–7.7)
NEUTROPHILS NFR BLD AUTO: 68.1 %
OSMOLALITY SERPL CALC.SUM OF ELEC: 288 MOSM/KG (ref 275–295)
PLATELET # BLD AUTO: 274 10(3)UL (ref 150–450)
POTASSIUM SERPL-SCNC: 3.9 MMOL/L (ref 3.5–5.1)
PROT SERPL-MCNC: 8 G/DL (ref 6.4–8.2)
RBC # BLD AUTO: 5.82 X10(6)UL
SODIUM SERPL-SCNC: 140 MMOL/L (ref 136–145)
WBC # BLD AUTO: 6 X10(3) UL (ref 4–11)

## 2022-03-26 PROCEDURE — C9113 INJ PANTOPRAZOLE SODIUM, VIA: HCPCS | Performed by: EMERGENCY MEDICINE

## 2022-03-26 PROCEDURE — 96361 HYDRATE IV INFUSION ADD-ON: CPT | Performed by: EMERGENCY MEDICINE

## 2022-03-26 PROCEDURE — 99284 EMERGENCY DEPT VISIT MOD MDM: CPT | Performed by: EMERGENCY MEDICINE

## 2022-03-26 PROCEDURE — 96365 THER/PROPH/DIAG IV INF INIT: CPT | Performed by: EMERGENCY MEDICINE

## 2022-03-26 PROCEDURE — 71045 X-RAY EXAM CHEST 1 VIEW: CPT | Performed by: EMERGENCY MEDICINE

## 2022-03-26 PROCEDURE — 80053 COMPREHEN METABOLIC PANEL: CPT | Performed by: EMERGENCY MEDICINE

## 2022-03-26 PROCEDURE — 96366 THER/PROPH/DIAG IV INF ADDON: CPT | Performed by: EMERGENCY MEDICINE

## 2022-03-26 PROCEDURE — 85025 COMPLETE CBC W/AUTO DIFF WBC: CPT | Performed by: EMERGENCY MEDICINE

## 2022-03-26 PROCEDURE — 96375 TX/PRO/DX INJ NEW DRUG ADDON: CPT | Performed by: EMERGENCY MEDICINE

## 2022-03-26 RX ORDER — LORAZEPAM 2 MG/ML
0.5 INJECTION INTRAMUSCULAR ONCE
Status: COMPLETED | OUTPATIENT
Start: 2022-03-26 | End: 2022-03-26

## 2022-03-26 RX ORDER — ONDANSETRON 2 MG/ML
4 INJECTION INTRAMUSCULAR; INTRAVENOUS ONCE
Status: COMPLETED | OUTPATIENT
Start: 2022-03-26 | End: 2022-03-26

## 2022-03-26 RX ORDER — ONDANSETRON 4 MG/1
4 TABLET, ORALLY DISINTEGRATING ORAL EVERY 4 HOURS PRN
Qty: 10 TABLET | Refills: 0 | Status: SHIPPED | OUTPATIENT
Start: 2022-03-26 | End: 2022-04-02

## 2022-03-26 RX ORDER — ALPRAZOLAM 0.5 MG/1
0.5 TABLET ORAL 3 TIMES DAILY PRN
Qty: 20 TABLET | Refills: 0 | Status: SHIPPED | OUTPATIENT
Start: 2022-03-26 | End: 2022-04-02

## 2022-03-28 ENCOUNTER — LAB ENCOUNTER (OUTPATIENT)
Dept: LAB | Facility: HOSPITAL | Age: 42
End: 2022-03-28
Attending: STUDENT IN AN ORGANIZED HEALTH CARE EDUCATION/TRAINING PROGRAM
Payer: COMMERCIAL

## 2022-03-28 DIAGNOSIS — Z20.822 ENCOUNTER FOR PREPROCEDURE SCREENING LABORATORY TESTING FOR COVID-19: ICD-10-CM

## 2022-03-28 DIAGNOSIS — Z01.812 ENCOUNTER FOR PREPROCEDURE SCREENING LABORATORY TESTING FOR COVID-19: ICD-10-CM

## 2022-03-28 DIAGNOSIS — Z01.818 PRE-OP TESTING: ICD-10-CM

## 2022-03-28 LAB — SARS-COV-2 RNA RESP QL NAA+PROBE: NOT DETECTED

## 2022-03-29 PROBLEM — K92.0 HEMATEMESIS: Status: ACTIVE | Noted: 2022-03-29

## 2022-03-29 PROBLEM — K21.9 GERD (GASTROESOPHAGEAL REFLUX DISEASE): Status: ACTIVE | Noted: 2022-03-29

## 2022-04-21 ENCOUNTER — OFFICE VISIT (OUTPATIENT)
Dept: FAMILY MEDICINE CLINIC | Facility: CLINIC | Age: 42
End: 2022-04-21
Payer: COMMERCIAL

## 2022-04-21 VITALS
TEMPERATURE: 97 F | DIASTOLIC BLOOD PRESSURE: 60 MMHG | HEART RATE: 68 BPM | SYSTOLIC BLOOD PRESSURE: 100 MMHG | WEIGHT: 238 LBS | HEIGHT: 74.61 IN | OXYGEN SATURATION: 98 % | BODY MASS INDEX: 29.9 KG/M2

## 2022-04-21 DIAGNOSIS — E78.2 MIXED HYPERLIPIDEMIA: ICD-10-CM

## 2022-04-21 DIAGNOSIS — K21.9 CHRONIC GERD: ICD-10-CM

## 2022-04-21 DIAGNOSIS — K22.70 BARRETT'S ESOPHAGUS WITHOUT DYSPLASIA: ICD-10-CM

## 2022-04-21 DIAGNOSIS — F41.9 ANXIETY: ICD-10-CM

## 2022-04-21 DIAGNOSIS — Z00.00 ROUTINE GENERAL MEDICAL EXAMINATION AT A HEALTH CARE FACILITY: Primary | ICD-10-CM

## 2022-04-21 PROCEDURE — 99396 PREV VISIT EST AGE 40-64: CPT | Performed by: FAMILY MEDICINE

## 2022-04-21 PROCEDURE — 3078F DIAST BP <80 MM HG: CPT | Performed by: FAMILY MEDICINE

## 2022-04-21 PROCEDURE — 3074F SYST BP LT 130 MM HG: CPT | Performed by: FAMILY MEDICINE

## 2022-04-21 PROCEDURE — 3008F BODY MASS INDEX DOCD: CPT | Performed by: FAMILY MEDICINE

## 2022-06-21 ENCOUNTER — HOSPITAL ENCOUNTER (OUTPATIENT)
Dept: CT IMAGING | Facility: HOSPITAL | Age: 42
Discharge: HOME | End: 2022-06-21
Attending: STUDENT IN AN ORGANIZED HEALTH CARE EDUCATION/TRAINING PROGRAM
Payer: COMMERCIAL

## 2022-06-21 DIAGNOSIS — R15.2 FECAL URGENCY: ICD-10-CM

## 2022-06-21 DIAGNOSIS — K86.89 PANCREATIC INSUFFICIENCY: ICD-10-CM

## 2022-06-21 DIAGNOSIS — R19.5 LOOSE STOOLS: ICD-10-CM

## 2022-06-21 DIAGNOSIS — R14.3 FLATULENCE, ERUCTATION AND GAS PAIN: ICD-10-CM

## 2022-06-21 DIAGNOSIS — R14.0 ABDOMINAL BLOATING: ICD-10-CM

## 2022-06-21 DIAGNOSIS — R14.2 FLATULENCE, ERUCTATION AND GAS PAIN: ICD-10-CM

## 2022-06-21 DIAGNOSIS — R14.1 FLATULENCE, ERUCTATION AND GAS PAIN: ICD-10-CM

## 2022-06-21 LAB — CREAT BLD-MCNC: 0.9 MG/DL

## 2022-06-21 PROCEDURE — 82565 ASSAY OF CREATININE: CPT

## 2022-06-21 PROCEDURE — 74177 CT ABD & PELVIS W/CONTRAST: CPT | Performed by: STUDENT IN AN ORGANIZED HEALTH CARE EDUCATION/TRAINING PROGRAM

## 2022-06-21 NOTE — PROGRESS NOTES
Referral dr Zack Le, please help patient scheduled  [place] [id number] [facility] [address] [phone]  [facility]    --    Nnamdi Ann,    To summarize our discussion over the telephone earlier, via this message. As we discussed your pancreas and liver appear normal.      There are findings in your abdomen which suggest you may have some inflammation in your mesentery, which is a collection of connective tissue in your abdomen. Therefore I recommend that you be further evaluated by general surgeon, I have placed this referral for you. It is important that you follow-up with them closely. You also have findings of colon diverticulosis, which are benign outpouchings in your colon. Given your history of symptoms I recommend that you keep your scheduled appointment for your colonoscopy with me later this week. Please let me know if you have any questions or concerns.      Sincerely,  Kushal Leblanc MD

## 2022-06-23 PROBLEM — R14.1 ABDOMINAL GAS PAIN: Status: ACTIVE | Noted: 2022-06-23

## 2022-06-23 PROBLEM — R19.4 CHANGE IN BOWEL HABITS: Status: ACTIVE | Noted: 2022-06-23

## 2022-06-23 PROBLEM — R19.7 DIARRHEA, UNSPECIFIED: Status: ACTIVE | Noted: 2022-06-23

## 2022-06-23 PROBLEM — Z80.0 FAMILY HISTORY OF COLON CANCER: Status: ACTIVE | Noted: 2022-06-23

## 2022-07-14 ENCOUNTER — OFFICE VISIT (OUTPATIENT)
Facility: LOCATION | Age: 42
End: 2022-07-14
Payer: COMMERCIAL

## 2022-07-14 VITALS
HEIGHT: 75 IN | HEART RATE: 55 BPM | DIASTOLIC BLOOD PRESSURE: 72 MMHG | SYSTOLIC BLOOD PRESSURE: 116 MMHG | TEMPERATURE: 98 F | WEIGHT: 239 LBS | BODY MASS INDEX: 29.72 KG/M2

## 2022-07-14 DIAGNOSIS — Z80.0 FAMILY HISTORY OF COLON CANCER: ICD-10-CM

## 2022-07-14 DIAGNOSIS — K22.70 BARRETT'S ESOPHAGUS WITHOUT DYSPLASIA: ICD-10-CM

## 2022-07-14 DIAGNOSIS — K65.4 MESENTERIC PANNICULITIS (HCC): Primary | ICD-10-CM

## 2022-07-14 DIAGNOSIS — K86.89 PANCREATIC INSUFFICIENCY: ICD-10-CM

## 2022-07-14 DIAGNOSIS — K44.9 HIATAL HERNIA: ICD-10-CM

## 2022-07-14 DIAGNOSIS — K21.9 GASTROESOPHAGEAL REFLUX DISEASE, UNSPECIFIED WHETHER ESOPHAGITIS PRESENT: ICD-10-CM

## 2022-08-29 ENCOUNTER — PATIENT MESSAGE (OUTPATIENT)
Dept: FAMILY MEDICINE CLINIC | Facility: CLINIC | Age: 42
End: 2022-08-29

## 2022-08-29 ENCOUNTER — OFFICE VISIT (OUTPATIENT)
Facility: LOCATION | Age: 42
End: 2022-08-29
Payer: COMMERCIAL

## 2022-08-29 VITALS — HEART RATE: 81 BPM | TEMPERATURE: 98 F

## 2022-08-29 DIAGNOSIS — K21.9 GASTROESOPHAGEAL REFLUX DISEASE, UNSPECIFIED WHETHER ESOPHAGITIS PRESENT: ICD-10-CM

## 2022-08-29 DIAGNOSIS — R76.8 ELEVATED IMMUNOGLOBULIN A: Primary | ICD-10-CM

## 2022-08-29 DIAGNOSIS — K65.4 MESENTERIC PANNICULITIS (HCC): Primary | ICD-10-CM

## 2022-08-29 DIAGNOSIS — K86.89 PANCREATIC INSUFFICIENCY: ICD-10-CM

## 2022-08-29 DIAGNOSIS — K44.9 HIATAL HERNIA: ICD-10-CM

## 2022-08-29 DIAGNOSIS — K22.70 BARRETT'S ESOPHAGUS WITHOUT DYSPLASIA: ICD-10-CM

## 2022-08-29 PROCEDURE — 99214 OFFICE O/P EST MOD 30 MIN: CPT | Performed by: COLON & RECTAL SURGERY

## 2022-08-30 NOTE — TELEPHONE ENCOUNTER
From: Cinthya Hernandez  To: Cecile Simon MD  Sent: 8/29/2022 2:05 PM CDT  Subject: High Immunoglobulin A test result 6/10/22    Good afternoon Dr Pooja Alejandro. As you may remember, Faizan Arizmendi been going through a number of GI tests. During some blood work, I had slightly high IGA results and they suggested I contact my general. The test results were 6/10/22, I had completely forgot they suggested this with everything else I was doing. Please let me know if you feel I need to do anything further.     Thanks     Harris Apple

## 2022-08-31 NOTE — PATIENT INSTRUCTIONS
The patient presents for a 6-week follow-up regarding mesenteric panniculitis seen on CT scan in June 2022 and GERD symptoms. The patient states since his his last visit, his symptoms have over all improved. He states over the past 3 to 4 days, his belching has increased and so have his reflux symptoms. He attributes this to beer consumption over the weekend. He states for the most part he has been avoiding highly acidic foods. He is taking omeprazole daily in the morning and Pepcid AC in the evenings. He denies nausea or vomiting. The patient states his abdominal pain has improved. He states he has had intermittent abdominal bloating. He is having normal bowel movements. He states since starting the Creon, his bowel movements have regulated. Clinical examination of the abdomen reveals it to be soft, nondistended, nontender, bowel sounds are normal activity normal pitch. There  is no rebounding tenderness or guarding. There are no signs of ascites or peritonitis. The liver and spleen are nonpalpable. There are no palpable masses. There are no umbilical or inguinal hernias. Again, I explained to the patient that the mesenteric panniculitis seen on CT scan in June of this year likely was not causing his abdominal pain and GERD symptoms. I believe his pancreatic insufficiency is what was causing the majority of his symptoms. I emphasized the importance of avoiding the foods on the GERD handout to the patient. I instructed the patient to take 4 Tums if he plans on consuming any food or beverage items on the GERD handout. If he then begins to have heartburn later in the evening, he should consume for more Tums. I recommend that he increase his omeprazole to 40 mg daily if his symptoms worsen in the future. Additionally, I recommend he start a probiotic. Lastly, we discussed possible surgical intervention in the future.   If the patient's symptoms are not resolving with medical management, then I recommend he see Dr. Zacarias Sofia and discuss surgical treatment options. The patient expressed understanding with the above plan. All questions and concerns were addressed. I have no further follow-up scheduled with this patient at this time.

## 2022-09-06 LAB
ALBUMIN: 4.4 G/DL (ref 3.8–4.8)
ALPHA-1-GLOBULINS: 0.3 G/DL (ref 0.2–0.3)
ALPHA-2-GLOBULINS: 0.7 G/DL (ref 0.5–0.9)
BETA 1 GLOBULIN: 0.5 G/DL (ref 0.4–0.6)
BETA 2 GLOBULIN: 0.4 G/DL (ref 0.2–0.5)
GAMMA GLOBULINS: 0.9 G/DL (ref 0.8–1.7)
IMMUNOGLOBULIN A: 324 MG/DL (ref 47–310)
PROTEIN, TOTAL: 7.1 G/DL (ref 6.1–8.1)

## 2022-09-07 ENCOUNTER — PATIENT MESSAGE (OUTPATIENT)
Dept: FAMILY MEDICINE CLINIC | Facility: CLINIC | Age: 42
End: 2022-09-07

## 2022-09-21 ENCOUNTER — OFFICE VISIT (OUTPATIENT)
Dept: FAMILY MEDICINE CLINIC | Facility: CLINIC | Age: 42
End: 2022-09-21

## 2022-09-21 VITALS
SYSTOLIC BLOOD PRESSURE: 110 MMHG | TEMPERATURE: 98 F | HEIGHT: 76 IN | OXYGEN SATURATION: 98 % | WEIGHT: 238 LBS | DIASTOLIC BLOOD PRESSURE: 60 MMHG | BODY MASS INDEX: 28.98 KG/M2 | HEART RATE: 76 BPM

## 2022-09-21 DIAGNOSIS — K44.9 HIATAL HERNIA: ICD-10-CM

## 2022-09-21 DIAGNOSIS — R76.8 ELEVATED IMMUNOGLOBULIN A: Primary | ICD-10-CM

## 2022-09-21 DIAGNOSIS — K21.9 GASTROESOPHAGEAL REFLUX DISEASE WITHOUT ESOPHAGITIS: ICD-10-CM

## 2022-09-21 DIAGNOSIS — E78.2 MIXED HYPERLIPIDEMIA: ICD-10-CM

## 2022-09-21 DIAGNOSIS — K86.89 PANCREATIC INSUFFICIENCY: ICD-10-CM

## 2022-09-21 PROCEDURE — 3078F DIAST BP <80 MM HG: CPT | Performed by: FAMILY MEDICINE

## 2022-09-21 PROCEDURE — 3008F BODY MASS INDEX DOCD: CPT | Performed by: FAMILY MEDICINE

## 2022-09-21 PROCEDURE — 99214 OFFICE O/P EST MOD 30 MIN: CPT | Performed by: FAMILY MEDICINE

## 2022-09-21 PROCEDURE — 3074F SYST BP LT 130 MM HG: CPT | Performed by: FAMILY MEDICINE

## 2022-09-21 NOTE — PATIENT INSTRUCTIONS
Schedule physical after 4/21  Touch base sooner if needed    Go to [facility] for fasting lab around March or April before your visit

## 2023-06-12 ENCOUNTER — WALK IN (OUTPATIENT)
Dept: URGENT CARE | Age: 43
End: 2023-06-12

## 2023-06-12 VITALS
HEART RATE: 80 BPM | SYSTOLIC BLOOD PRESSURE: 108 MMHG | RESPIRATION RATE: 16 BRPM | OXYGEN SATURATION: 98 % | DIASTOLIC BLOOD PRESSURE: 66 MMHG | TEMPERATURE: 97.9 F

## 2023-06-12 DIAGNOSIS — J30.89 ALLERGIC RHINITIS DUE TO OTHER ALLERGIC TRIGGER, UNSPECIFIED SEASONALITY: Primary | ICD-10-CM

## 2023-06-12 PROBLEM — L72.3 INFECTED SEBACEOUS CYST: Status: ACTIVE | Noted: 2020-11-17

## 2023-06-12 PROBLEM — F41.9 ANXIETY: Status: ACTIVE | Noted: 2022-03-25

## 2023-06-12 PROBLEM — K65.4 MESENTERIC PANNICULITIS (CMD): Status: ACTIVE | Noted: 2022-07-14

## 2023-06-12 PROBLEM — K86.89 PANCREATIC INSUFFICIENCY: Status: ACTIVE | Noted: 2022-07-14

## 2023-06-12 PROBLEM — R14.1 ABDOMINAL GAS PAIN: Status: ACTIVE | Noted: 2022-06-23

## 2023-06-12 PROBLEM — R19.7 DIARRHEA, UNSPECIFIED: Status: ACTIVE | Noted: 2022-06-23

## 2023-06-12 PROBLEM — E78.2 MIXED HYPERLIPIDEMIA: Status: ACTIVE | Noted: 2022-03-25

## 2023-06-12 PROBLEM — Z80.0 FAMILY HISTORY OF COLON CANCER: Status: ACTIVE | Noted: 2022-06-23

## 2023-06-12 PROBLEM — K92.0 HEMATEMESIS: Status: ACTIVE | Noted: 2022-03-29

## 2023-06-12 PROBLEM — K22.70 BARRETT'S ESOPHAGUS WITHOUT DYSPLASIA: Status: ACTIVE | Noted: 2022-04-21

## 2023-06-12 PROBLEM — L08.9 INFECTED SEBACEOUS CYST: Status: ACTIVE | Noted: 2020-11-17

## 2023-06-12 PROBLEM — K44.9 HIATAL HERNIA: Status: ACTIVE | Noted: 2022-07-14

## 2023-06-12 PROBLEM — K21.9 GASTROESOPHAGEAL REFLUX DISEASE: Status: ACTIVE | Noted: 2022-03-25

## 2023-06-12 LAB
INTERNAL PROCEDURAL CONTROLS ACCEPTABLE: YES
S PYO AG THROAT QL IA.RAPID: NEGATIVE
TEST LOT EXPIRATION DATE: NORMAL
TEST LOT NUMBER: NORMAL

## 2023-06-12 PROCEDURE — 99213 OFFICE O/P EST LOW 20 MIN: CPT | Performed by: NURSE PRACTITIONER

## 2023-06-12 PROCEDURE — 87880 STREP A ASSAY W/OPTIC: CPT | Performed by: NURSE PRACTITIONER

## 2023-06-12 RX ORDER — OMEPRAZOLE 40 MG/1
CAPSULE, DELAYED RELEASE ORAL
COMMUNITY
Start: 2023-05-16

## 2023-06-12 ASSESSMENT — ENCOUNTER SYMPTOMS
RESPIRATORY NEGATIVE: 1
CONSTITUTIONAL NEGATIVE: 1
SORE THROAT: 0
RHINORRHEA: 1

## 2023-11-21 ENCOUNTER — V-VISIT (OUTPATIENT)
Dept: URGENT CARE | Age: 43
End: 2023-11-21

## 2023-11-21 VITALS
HEIGHT: 75 IN | OXYGEN SATURATION: 98 % | WEIGHT: 240 LBS | DIASTOLIC BLOOD PRESSURE: 74 MMHG | TEMPERATURE: 97.3 F | BODY MASS INDEX: 29.84 KG/M2 | SYSTOLIC BLOOD PRESSURE: 114 MMHG | RESPIRATION RATE: 18 BRPM | HEART RATE: 79 BPM

## 2023-11-21 DIAGNOSIS — J06.9 VIRAL UPPER RESPIRATORY TRACT INFECTION: Primary | ICD-10-CM

## 2023-11-21 PROCEDURE — 99213 OFFICE O/P EST LOW 20 MIN: CPT | Performed by: NURSE PRACTITIONER

## 2023-11-21 ASSESSMENT — ENCOUNTER SYMPTOMS
CONSTITUTIONAL NEGATIVE: 1
PSYCHIATRIC NEGATIVE: 1
ALLERGIC/IMMUNOLOGIC NEGATIVE: 1
ENDOCRINE NEGATIVE: 1
GASTROINTESTINAL NEGATIVE: 1
COUGH: 1
NEUROLOGICAL NEGATIVE: 1
EYES NEGATIVE: 1
HEMATOLOGIC/LYMPHATIC NEGATIVE: 1

## 2023-11-21 ASSESSMENT — PAIN SCALES - GENERAL: PAINLEVEL: 0

## 2024-02-08 ENCOUNTER — OFFICE VISIT (OUTPATIENT)
Dept: FAMILY MEDICINE CLINIC | Facility: CLINIC | Age: 44
End: 2024-02-08
Payer: COMMERCIAL

## 2024-02-08 ENCOUNTER — PATIENT MESSAGE (OUTPATIENT)
Dept: FAMILY MEDICINE CLINIC | Facility: CLINIC | Age: 44
End: 2024-02-08

## 2024-02-08 VITALS
TEMPERATURE: 98 F | SYSTOLIC BLOOD PRESSURE: 112 MMHG | HEART RATE: 75 BPM | DIASTOLIC BLOOD PRESSURE: 66 MMHG | BODY MASS INDEX: 31.68 KG/M2 | RESPIRATION RATE: 18 BRPM | WEIGHT: 254.81 LBS | OXYGEN SATURATION: 97 % | HEIGHT: 75 IN

## 2024-02-08 DIAGNOSIS — E78.2 MIXED HYPERLIPIDEMIA: ICD-10-CM

## 2024-02-08 DIAGNOSIS — R76.8 ELEVATED IMMUNOGLOBULIN A: ICD-10-CM

## 2024-02-08 DIAGNOSIS — K21.9 CHRONIC GERD: ICD-10-CM

## 2024-02-08 DIAGNOSIS — K22.70 BARRETT'S ESOPHAGUS WITHOUT DYSPLASIA: ICD-10-CM

## 2024-02-08 DIAGNOSIS — Z00.00 ROUTINE GENERAL MEDICAL EXAMINATION AT A HEALTH CARE FACILITY: Primary | ICD-10-CM

## 2024-02-08 DIAGNOSIS — K58.8 OTHER IRRITABLE BOWEL SYNDROME: ICD-10-CM

## 2024-02-08 PROCEDURE — 99214 OFFICE O/P EST MOD 30 MIN: CPT | Performed by: FAMILY MEDICINE

## 2024-02-08 PROCEDURE — 99396 PREV VISIT EST AGE 40-64: CPT | Performed by: FAMILY MEDICINE

## 2024-02-08 NOTE — PROGRESS NOTES
Nicholas Pace is a 43 year old male who is here for   Chief Complaint   Patient presents with    Wellness Visit    Constipation     Bloating and gas        HPI:     1. Routine general medical examination at a health care facility  -due    2. GERD  3. Barretts Esophagus  -stable on PPI  -contnues to see GI    4. Anxiety  -better    5. Mixed Hyperlipidemia  -due for repeat labs    6. Elevated immunoglobulin A  7. 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)    Pertinent Fam Hx:    Family History   Problem Relation Age of Onset    Breast Cancer Mother     Other (Tourette Syndrome) Son     Colon Cancer Paternal Uncle         50 years old    Prostate Cancer Paternal Uncle         51 years old       Social History     Socioeconomic History    Marital status:    Tobacco Use    Smoking status: Never    Smokeless tobacco: Never   Vaping Use    Vaping Use: Never used   Substance and Sexual Activity    Alcohol use: Yes     Alcohol/week: 5.0 standard drinks of alcohol     Types: 5 Standard drinks or equivalent per week     Comment: Stopped drinking for last month    Drug use: No   Other Topics Concern    Caffeine Concern No    Exercise No    Seat Belt Yes       Wt Readings from Last 6 Encounters:   02/08/24 254 lb 12.8 oz (115.6 kg)   03/13/23 251 lb 3.2 oz (113.9 kg)   09/21/22 238 lb (108 kg)   08/17/22 246 lb (111.6 kg)   07/14/22 239 lb (108.4 kg)   06/15/22 239 lb 12.8 oz (108.8 kg)       Patient Active  Problem List   Diagnosis    Infected sebaceous cyst    Chronic GERD    Anxiety    Mixed hyperlipidemia    GERD (gastroesophageal reflux disease)    Hematemesis    Goodrich's esophagus without dysplasia    Change in bowel habits    Diarrhea, unspecified    Abdominal bloating    Family history of colon cancer    Mesenteric panniculitis (HCC)    Hiatal hernia    Pancreatic insufficiency (HCC)    Gastroesophageal reflux disease       Current Outpatient Medications on File Prior to Visit   Medication Sig Dispense Refill    Omeprazole 40 MG Oral Capsule Delayed Release Take 1 capsule (40 mg total) by mouth daily. 90 capsule 2     No current facility-administered medications on file prior to visit.       REVIEW OF SYSTEMS:     GENERAL HEALTH: feels well otherwise. No f/c  NEURO: denies any headaches, LH, dizzyness, LOC, falls  VISION: denies any blurred or double vision  RESPIRATORY: denies shortness of breath, cough, or congestion  CARDIOVASCULAR: denies chest pain, pressure or palpitations  GI: denies abdominal pain, constipation, diarrhea, n/v, BRBPR, melena  : no dysuria, frequency, or hematuria  SKIN: denies any unusual skin lesions or rashes  PSYCH: mood is stable  EXT: denies edema     Wt Readings from Last 6 Encounters:   02/08/24 254 lb 12.8 oz (115.6 kg)   03/13/23 251 lb 3.2 oz (113.9 kg)   09/21/22 238 lb (108 kg)   08/17/22 246 lb (111.6 kg)   07/14/22 239 lb (108.4 kg)   06/15/22 239 lb 12.8 oz (108.8 kg)       No Known Allergies    Patient Active Problem List   Diagnosis    Infected sebaceous cyst    Chronic GERD    Anxiety    Mixed hyperlipidemia    GERD (gastroesophageal reflux disease)    Hematemesis    Goodrich's esophagus without dysplasia    Change in bowel habits    Diarrhea, unspecified    Abdominal bloating    Family history of colon cancer    Mesenteric panniculitis (HCC)    Hiatal hernia    Pancreatic insufficiency (HCC)    Gastroesophageal reflux disease       Family History   Problem Relation  Age of Onset    Breast Cancer Mother     Other (Tourette Syndrome) Son     Colon Cancer Paternal Uncle         50 years old    Prostate Cancer Paternal Uncle         51 years old      Past Medical History:   Diagnosis Date    Anxiety Feb 1    very new    Belching Feb 1    quite a bit    Bloating Feb 1    Chest pain     Chronic cough     Fatigue Feb 1    Flatulence/gas pain/belching Feb 1    Heartburn Feb 1    always have had - this episode    Indigestion Feb 1    Loss of appetite Feb 1    Nausea Feb 1    Painful swallowing Feb 1    Problems with swallowing Feb 1    minimal, but yes    Spitting up blood     Sputum production     Uncomfortable fullness after meals Feb 1    Vomiting Feb 1    Vomiting blood Mar 26    one time    Wears glasses     Weight loss     A little      Past Surgical History:   Procedure Laterality Date    OTHER      - EXCISION OF SEB CYST ON BACK OF POSTERIOR NECK     REPAIR ROTATOR CUFF,ACUTE Left 2014      Social History:    Social History     Socioeconomic History    Marital status:    Tobacco Use    Smoking status: Never    Smokeless tobacco: Never   Vaping Use    Vaping Use: Never used   Substance and Sexual Activity    Alcohol use: Yes     Alcohol/week: 5.0 standard drinks of alcohol     Types: 5 Standard drinks or equivalent per week     Comment: Stopped drinking for last month    Drug use: No   Other Topics Concern    Caffeine Concern No    Exercise No    Seat Belt Yes           EXAM:   /66   Pulse 75   Temp 97.7 °F (36.5 °C) (Temporal)   Resp 18   Ht 6' 3\" (1.905 m)   Wt 254 lb 12.8 oz (115.6 kg)   SpO2 97%   BMI 31.85 kg/m²     GENERAL: A&O, in no apparent distress  HEENT: atraumatic, MMM, throat is clear  EYES: PERRLA, EOMI  NECK: supple, no thyromegaly  CHEST: no tenderness  LUNGS: clear to auscultation bilateraly, no c/w/r  CARDIO: RRR without murmurs  GI: soft, non-tender, non-distended, no appreciable hsm, bs throughout  NEURO: CN II-XII grossly intact  PSYCH:  pleasant  MUSCULOSKELETAL: normal gait, no appreciable defects  EXTREMITIES: no cyanosis, clubbing or edema  SKIN: no rashes,no suspicious lesions    Problem focused exam (for problems outside of physical, if any):    The 10-year ASCVD risk score (Zafar HERNANDEZ, et al., 2019) is: 2.8%    Values used to calculate the score:      Age: 43 years      Sex: Male      Is Non- : No      Diabetic: No      Tobacco smoker: No      Systolic Blood Pressure: 112 mmHg      Is BP treated: No      HDL Cholesterol: 33 mg/dL      Total Cholesterol: 205 mg/dL    ASSESSMENT AND PLAN:     Health Maintenance  -We discussed the following:  Healthy diet and exercise, immunizations, and cancer screening    -Fasting labs ordered    Stress Management: counseled    1. Routine general medical examination at a health care facility  -reviewed age appropriate screening    2. Chronic GERD  3. Goodrich's esophagus without dysplasia  -c/w pantoprazole  -questions answered  -f/u with GI as planned    4. Anxiety  -improvin  -he will f/u with me prn    5. Mixed hyperlipidemia  -counseled on lifestyle modification  -recheck labs    6. Elevated immunoglobulin A  -unclear etiology  -will check rheum panel   -will also refer to allergist for further input based on lab findings    - Allergy Referral - In Network  - IMMUNOGLOBULIN A, QN, SERUM [539] [Q]  - CBC With Differential With Platelet  - Comp Metabolic Panel (14)  - Lipid Panel  - TSH W Reflex To Free T4  - Rheumatoid Arthritis Factor  - Uric Acid, Serum  - Cyclic Citrullinate Peptide (CCP) antibodies  - Sed Rate, Westergren (Automated)  - C-Reactive Protein  - Jessica, Direct, Reflex To 9 Enas ([facility])    6. Other irritable bowel syndrome  -reviewed expectant management  -referred to - DIETITIAN EDUCATION NON-DIABETES, DIET (INTERNAL)  -f/u here in 3 motnhs      Orders This Visit:  Orders Placed This Encounter   Procedures    IMMUNOGLOBULIN A, QN, SERUM [539] [Q]    CBC With  Differential With Platelet    Comp Metabolic Panel (14)    Lipid Panel    TSH W Reflex To Free T4    Rheumatoid Arthritis Factor    Uric Acid, Serum    Cyclic Citrullinate Peptide (CCP) antibodies    Sed Rate, Westergren (Automated)    C-Reactive Protein    Jessica, Direct, Reflex To 9 Enas ([facility])    ANTINUCLEAR ANTIBODIES REFLEX AND PATTERN    STAGE 1    STAGE 2    INTERPRETATION       Meds This Visit:  Requested Prescriptions      No prescriptions requested or ordered in this encounter       Imaging & Referrals:  DIETITIAN EDUCATION NON-DIABETES, DIET (INTERNAL)  ALLERGY - INTERNAL     The patient indicates understanding of these issues and agrees to the plan.  The patient is asked to return in per above.  NOLA DAY MD      Overall 40 minutes was spent on this encounter, 30 mins spent reviewing and providing care coordination for these conditions: ibs, gerd  10 minutes was spent reviewing wellness elements and providing age appropriate screenings.

## 2024-02-09 NOTE — TELEPHONE ENCOUNTER
From: Nicholas Pace  To: NOLA DAY  Sent: 2/8/2024 4:49 PM CST  Subject: Refferals    Thanks for your time today Dr. Day. I may have missed it, but I did not see the allergist and dietitian referral we discussed today. Please advise when you have a chance.    Thanks

## 2024-02-16 LAB
ABSOLUTE BASOPHILS: 50 CELLS/UL (ref 0–200)
ABSOLUTE EOSINOPHILS: 121 CELLS/UL (ref 15–500)
ABSOLUTE LYMPHOCYTES: 1909 CELLS/UL (ref 850–3900)
ABSOLUTE MONOCYTES: 440 CELLS/UL (ref 200–950)
ABSOLUTE NEUTROPHILS: 2981 CELLS/UL (ref 1500–7800)
ALBUMIN/GLOBULIN RATIO: 1.7 (CALC) (ref 1–2.5)
ALBUMIN: 4.5 G/DL (ref 3.6–5.1)
ALKALINE PHOSPHATASE: 82 U/L (ref 36–130)
ALT: 25 U/L (ref 9–46)
ANA SCREEN, IFA: POSITIVE
AST: 24 U/L (ref 10–40)
BASOPHILS: 0.9 %
BILIRUBIN, TOTAL: 0.4 MG/DL (ref 0.2–1.2)
BUN: 11 MG/DL (ref 7–25)
C-REACTIVE PROTEIN: 1.7 MG/L
CALCIUM: 9.8 MG/DL (ref 8.6–10.3)
CARBON DIOXIDE: 31 MMOL/L (ref 20–32)
CHLORIDE: 105 MMOL/L (ref 98–110)
CHOL/HDLC RATIO: 6.2 (CALC)
CHOLESTEROL, TOTAL: 205 MG/DL
CREATININE: 0.97 MG/DL (ref 0.6–1.29)
CYCLIC CITRULLINATED$PEPTIDE (CCP) AB (IGG): <16 UNITS
DNA (DS) ANTIBODY: 1 IU/ML
EGFR: 98 ML/MIN/1.73M2
EOSINOPHILS: 2.2 %
GLOBULIN: 2.6 G/DL (CALC) (ref 1.9–3.7)
GLUCOSE: 89 MG/DL (ref 65–99)
HDL CHOLESTEROL: 33 MG/DL
HEMATOCRIT: 45.3 % (ref 38.5–50)
HEMOGLOBIN: 14.6 G/DL (ref 13.2–17.1)
IMMUNOGLOBULIN A: 299 MG/DL (ref 47–310)
LDL-CHOLESTEROL: 138 MG/DL (CALC)
LYMPHOCYTES: 34.7 %
MCH: 26.3 PG (ref 27–33)
MCHC: 32.2 G/DL (ref 32–36)
MCV: 81.6 FL (ref 80–100)
MONOCYTES: 8 %
MPV: 10.6 FL (ref 7.5–12.5)
NEUTROPHILS: 54.2 %
NON-HDL CHOLESTEROL: 172 MG/DL (CALC)
PLATELET COUNT: 272 THOUSAND/UL (ref 140–400)
POTASSIUM: 4.5 MMOL/L (ref 3.5–5.3)
PROTEIN, TOTAL: 7.1 G/DL (ref 6.1–8.1)
RDW: 13.4 % (ref 11–15)
RED BLOOD CELL COUNT: 5.55 MILLION/UL (ref 4.2–5.8)
RHEUMATOID FACTOR: <14 IU/ML
SED RATE BY MODIFIED$WESTERGREN: 2 MM/H
SODIUM: 141 MMOL/L (ref 135–146)
TRIGLYCERIDES: 197 MG/DL
TSH W/REFLEX TO FT4: 1.77 MIU/L (ref 0.4–4.5)
URIC ACID: 6.2 MG/DL (ref 4–8)
WHITE BLOOD CELL COUNT: 5.5 THOUSAND/UL (ref 3.8–10.8)

## 2024-03-25 ENCOUNTER — HOSPITAL ENCOUNTER (OUTPATIENT)
Dept: CT IMAGING | Age: 44
Discharge: HOME | End: 2024-03-25
Attending: STUDENT IN AN ORGANIZED HEALTH CARE EDUCATION/TRAINING PROGRAM
Payer: COMMERCIAL

## 2024-03-25 DIAGNOSIS — K65.4 SCLEROSING MESENTERITIS (HCC): ICD-10-CM

## 2024-03-25 LAB
CREAT BLD-MCNC: 1.2 MG/DL
EGFRCR SERPLBLD CKD-EPI 2021: 76 ML/MIN/1.73M2 (ref 60–?)

## 2024-03-25 PROCEDURE — 74177 CT ABD & PELVIS W/CONTRAST: CPT | Performed by: STUDENT IN AN ORGANIZED HEALTH CARE EDUCATION/TRAINING PROGRAM

## 2024-03-25 PROCEDURE — 82565 ASSAY OF CREATININE: CPT

## 2024-03-29 NOTE — PROGRESS NOTES
-luis manuel please schedule EGD w/ [facility]  -please notify pt - referral to heme/onc   -attempted to call pt, no response, lvm. [facility]      ----    Fabio Peterson,    I tried to call you via the phone, but was unable to reach you.     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 It sometimes causes fibrosis (scarring) of the tissues.     There are some lymph nodes seen in this CT scan, and I recommend that you see a hematologist for further evaluation of this - I have placed this referral for you.     There is also a prominent appearing area of the stomach - this may be due to the anatomy, however, I recommend an upper endoscope ( EGD procedure) for further evaluation.    Based on your evaluation with the hematologist we can consider a repeat CT scan in 6 months.       Please let me know if you have any questions or concerns.     Sincerely,  Dom Londono MD

## 2024-04-01 ENCOUNTER — TELEPHONE (OUTPATIENT)
Dept: HEMATOLOGY/ONCOLOGY | Age: 44
End: 2024-04-01

## 2024-04-04 ENCOUNTER — OFFICE VISIT (OUTPATIENT)
Dept: FAMILY MEDICINE CLINIC | Facility: CLINIC | Age: 44
End: 2024-04-04
Payer: COMMERCIAL

## 2024-04-04 VITALS
TEMPERATURE: 98 F | DIASTOLIC BLOOD PRESSURE: 68 MMHG | OXYGEN SATURATION: 97 % | SYSTOLIC BLOOD PRESSURE: 112 MMHG | BODY MASS INDEX: 31.33 KG/M2 | HEIGHT: 75 IN | WEIGHT: 252 LBS | HEART RATE: 84 BPM | RESPIRATION RATE: 18 BRPM

## 2024-04-04 DIAGNOSIS — K58.8 OTHER IRRITABLE BOWEL SYNDROME: Primary | ICD-10-CM

## 2024-04-04 DIAGNOSIS — R76.8 POSITIVE ANA (ANTINUCLEAR ANTIBODY): ICD-10-CM

## 2024-04-04 DIAGNOSIS — K21.9 CHRONIC GERD: ICD-10-CM

## 2024-04-04 DIAGNOSIS — K22.70 BARRETT'S ESOPHAGUS WITHOUT DYSPLASIA: ICD-10-CM

## 2024-04-04 DIAGNOSIS — R76.8 SCL-70 ANTIBODY POSITIVE: ICD-10-CM

## 2024-04-04 PROCEDURE — 99214 OFFICE O/P EST MOD 30 MIN: CPT | Performed by: FAMILY MEDICINE

## 2024-04-09 NOTE — PROGRESS NOTES
Nicholas Pace is a 43 year old male here for   Chief Complaint   Patient presents with    Lab    Follow - Up     Here to review labs done 2/14/24       HPI:       1. Other irritable bowel syndrome  2. Chronic GERD  3. Goodrich's esophagus without dysplasia  -has improved diet and most symptoms improving  -much better than he was 3-6 months ago  -continues to followup with GI - has upcoming EGD scheduled    4. Scl-70 antibody positive  5. Positive GREER (antinuclear antibody)  -reviewed recent labs         HISTORY:  Past Medical History:   Diagnosis Date    Anxiety Feb 1    very new    Belching Feb 1    quite a bit    Bloating Feb 1    Chest pain     Chronic cough     Fatigue Feb 1    Flatulence/gas pain/belching Feb 1    Heartburn Feb 1    always have had - this episode    Indigestion Feb 1    Loss of appetite Feb 1    Nausea Feb 1    Painful swallowing Feb 1    Problems with swallowing Feb 1    minimal, but yes    Spitting up blood     Sputum production     Uncomfortable fullness after meals Feb 1    Vomiting Feb 1    Vomiting blood Mar 26    one time    Wears glasses     Weight loss     A little      Past Surgical History:   Procedure Laterality Date    OTHER      - EXCISION OF SEB CYST ON BACK OF POSTERIOR NECK     REPAIR ROTATOR CUFF,ACUTE Left 2014      Family History   Problem Relation Age of Onset    Breast Cancer Mother     Other (Tourette Syndrome) Son     Colon Cancer Paternal Uncle         50 years old    Prostate Cancer Paternal Uncle         51 years old      Social History:   Social History     Socioeconomic History    Marital status:    Tobacco Use    Smoking status: Never    Smokeless tobacco: Never   Vaping Use    Vaping Use: Never used   Substance and Sexual Activity    Alcohol use: Yes     Alcohol/week: 5.0 standard drinks of alcohol     Types: 5 Standard drinks or equivalent per week    Drug use: No   Other Topics Concern    Caffeine Concern Yes    Stress Concern No    Weight Concern No     Special Diet Yes    Exercise Yes    Seat Belt Yes        Medications (Active prior to today's visit):  Current Outpatient Medications   Medication Sig Dispense Refill    omeprazole 20 MG Oral Capsule Delayed Release Take one capsule (20 mg total) by mouth once daily, 30 minutes prior to breakfast. 90 capsule 1    Levomefolate Glucosamine (METHYL-FOLATE OR) Take 400 mcg by mouth daily.      cholecalciferol 50 MCG (2000 UT) Oral Cap Take 1 capsule (2,000 Units total) by mouth daily.      METAMUCIL FIBER OR Take 1 each by mouth daily.      B Complex Vitamins (B COMPLEX 1 OR) Take 1 each by mouth daily.         Allergies:  No Known Allergies      ROS:   See HPI for relevant ROS    --GEN: No other complaints  --HEENT: No other complaints  --RESP: No other complaints  --CV: No other complaints  --GI: No other complaints  --MSK: No other complaints    All other systems reviewed are negative    PHYSICAL EXAM:   /68 (BP Location: Left arm, Patient Position: Sitting)   Pulse 84   Temp 97.5 °F (36.4 °C) (Temporal)   Resp 18   Ht 6' 3\" (1.905 m)   Wt 252 lb (114.3 kg)   SpO2 97%   BMI 31.50 kg/m²     Gen: NAD  HEENT: NCAT, pupils equal and round  Pulm: CTAB, no wheezing  CV: RRR  Ext: full ROM  Psych: normal affect     ASSESSMENT/PLAN:     1. Other irritable bowel syndrome  2. Chronic GERD  3. Goodrich's esophagus without dysplasia  -c/w PPI and fiber  -c/w dietary modification  -f/u with GI and EGD as planned  -questions answered    4. Scl-70 antibody positive  5. Positive GREER (antinuclear antibody)  -reviewed recent lab findings  -questions answered  -has scheduled f/u with rheumatology  - advised to keep this appt for further eval whether these lab results are significant or not        Chronic Conditions:    No problem-specific Assessment & Plan notes found for this encounter.       Health Maintenance:  Health Maintenance   Topic Date Due    DTaP,Tdap,and Td Vaccines (1 - Tdap) Never done    COVID-19 Vaccine (4 -  2023-24 season) 09/01/2023    Influenza Vaccine (Season Ended) 10/01/2024    Annual Physical  02/08/2025    Colorectal Cancer Screening  06/23/2027    Annual Depression Screening  Completed    Pneumococcal Vaccine: Birth to 64yrs  Aged Out               The patient is asked to return in prn.    Orders This Visit:  No orders of the defined types were placed in this encounter.      Meds This Visit:  Requested Prescriptions      No prescriptions requested or ordered in this encounter       Imaging & Referrals:  None     NOLA DAY MD

## 2024-04-11 PROBLEM — R93.3 ABNORMAL FINDINGS ON DIAGNOSTIC IMAGING OF OTHER PARTS OF DIGESTIVE TRACT: Status: ACTIVE | Noted: 2024-04-11

## 2024-04-16 ENCOUNTER — OFFICE VISIT (OUTPATIENT)
Dept: HEMATOLOGY/ONCOLOGY | Facility: HOSPITAL | Age: 44
End: 2024-04-16
Attending: INTERNAL MEDICINE
Payer: COMMERCIAL

## 2024-04-16 VITALS
BODY MASS INDEX: 31.11 KG/M2 | HEIGHT: 74.02 IN | WEIGHT: 242.38 LBS | SYSTOLIC BLOOD PRESSURE: 127 MMHG | OXYGEN SATURATION: 98 % | TEMPERATURE: 98 F | RESPIRATION RATE: 16 BRPM | DIASTOLIC BLOOD PRESSURE: 86 MMHG | HEART RATE: 73 BPM

## 2024-04-16 DIAGNOSIS — K65.4 MESENTERIC PANNICULITIS (HCC): ICD-10-CM

## 2024-04-16 DIAGNOSIS — R59.9 LYMPH NODES ENLARGED: Primary | ICD-10-CM

## 2024-04-16 PROCEDURE — 99204 OFFICE O/P NEW MOD 45 MIN: CPT | Performed by: INTERNAL MEDICINE

## 2024-04-16 NOTE — PROGRESS NOTES
Hematology/Oncology Initial Consultation Note    Patient Name: Nicholas Pace  Medical Record Number: [id number]    YOB: 1980   Date of Consultation: 4/16/2024   PCP: NOLA DAY MD    Reason for Consultation:  Nicholas Pace was seen today for the diagnosis of sclerosing mesenteritis    History of Present Illness:      42 y/o M presenting for evaluation of sclerosing mesenteritis.    - Had a CT A/P in 6/2022 for intermittent abdominal pain which showed likely sclerosing mesenteritis with subcentimeter mesenteric lymph nodes, not enlarged  - repeat CT A/P in 3/2024 showed stable sclerosing mesenteritis with stable lymph nodes. No new lymphadenopathy  - he reports he has changed his diet and intentionally started losing weight; notes that since he has made the changes his abdominal discomfort has gotten better  - he has an appointment with rheumatology in 7/2024  - FH of breast cancer in mother, colon cancer and prostate cancer in uncles. He is up to date on colonoscopy      Past Medical History:  Past Medical History:    Anxiety    very new    Belching    quite a bit    Bloating    Chest pain    Chronic cough    Fatigue    Flatulence/gas pain/belching    Heartburn    always have had - this episode    Indigestion    Loss of appetite    Nausea    Painful swallowing    Problems with swallowing    minimal, but yes    Spitting up blood    Sputum production    Uncomfortable fullness after meals    Vomiting    Vomiting blood    one time    Wears glasses    Weight loss    A little       Past Surgical History:   Procedure Laterality Date    Other      - EXCISION OF SEB CYST ON BACK OF POSTERIOR NECK     Repair rotator cuff,acute Left 2014       Home Medications:  No outpatient medications have been marked as taking for the 4/16/24 encounter (Appointment) with Giuliano Garsia MD.     -------  Current Outpatient Medications on File Prior to Visit   Medication Sig Dispense Refill    omeprazole 20 MG Oral  Capsule Delayed Release Take one capsule (20 mg total) by mouth once daily, 30 minutes prior to breakfast. 90 capsule 1    Levomefolate Glucosamine (METHYL-FOLATE OR) Take 400 mcg by mouth daily.      cholecalciferol 50 MCG (2000 UT) Oral Cap Take 1 capsule (2,000 Units total) by mouth daily.      METAMUCIL FIBER OR Take 1 each by mouth daily.      B Complex Vitamins (B COMPLEX 1 OR) Take 1 each by mouth daily.       Current Facility-Administered Medications on File Prior to Visit   Medication Dose Route Frequency Provider Last Rate Last Admin    [COMPLETED] iopamidol 76% (ISOVUE-370) injection for power injector  88 mL Intravenous ONCE PRN Dom Londono MD   88 mL at 03/25/24 9162       Allergies:   No Known Allergies    Psychosocial History:  Social History     Social History Narrative    Not on file     Social History     Socioeconomic History    Marital status:    Tobacco Use    Smoking status: Never    Smokeless tobacco: Never   Vaping Use    Vaping status: Never Used   Substance and Sexual Activity    Alcohol use: Yes     Alcohol/week: 5.0 standard drinks of alcohol     Types: 5 Standard drinks or equivalent per week    Drug use: No   Other Topics Concern    Caffeine Concern Yes    Stress Concern No    Weight Concern No    Special Diet Yes    Exercise Yes    Seat Belt Yes       Family Medical History:  Family History   Problem Relation Age of Onset    Breast Cancer Mother     Other (Tourette Syndrome) Son     Colon Cancer Paternal Uncle         50 years old    Prostate Cancer Paternal Uncle         51 years old       Review of Systems:  A 10-point ROS was done with pertinent positives and negative per the HPI    Vital Signs:  Height: --  Weight: --  BSA (Calculated - sq m): --  Pulse: --  BP: --  Temp: --  Do Not Use - Resp Rate: --  SpO2: --    Wt Readings from Last 6 Encounters:   04/04/24 114.3 kg (252 lb)   03/11/24 115.2 kg (254 lb)   02/08/24 115.6 kg (254 lb 12.8 oz)   03/13/23 113.9 kg (251 lb  3.2 oz)   09/21/22 108 kg (238 lb)   08/17/22 111.6 kg (246 lb)       Physical Examination:  General: Patient is alert and oriented, not in acute distress  Psych: Mood and affect are appropriate  Eyes: EOMI, PERRL  ENT: Oropharynx is clear, no adenopathy  CV: nl S1/S2, no LE edema  Respiratory: CTAB, non-labored respirations  GI/Abd: Soft, non-tender, no appreciable hepatosplenomegaly  Neurological: Grossly intact   Lymphatics: No palpable cervical, supraclavicular, axillary, or inguinal lymphadenopathy  Skin: no rashes or petechiae    Laboratory:  Lab Results   Component Value Date    WBC 5.5 02/14/2024    WBC 6.0 03/26/2022    WBC 5.8 03/25/2022    HGB 14.6 02/14/2024    HGB 15.3 03/26/2022    HGB 15.1 03/25/2022    HCT 45.3 02/14/2024    MCV 81.6 02/14/2024    MCH 26.3 (L) 02/14/2024    MCHC 32.2 02/14/2024    RDW 13.4 02/14/2024     02/14/2024    .0 03/26/2022    .0 03/25/2022     Lab Results   Component Value Date    GLU 89 02/14/2024    BUN 11 02/14/2024    BUNCREA SEE NOTE: 02/14/2024    CREATSERUM 0.97 02/14/2024    CREATSERUM 1.02 08/02/2022    CREATSERUM 1.07 03/26/2022    ANIONGAP 4 03/26/2022    GFRNAA 104 06/21/2022    GFRAA 121 06/21/2022    CA 9.8 02/14/2024    OSMOCALC 288 03/26/2022    ALKPHO 82 02/14/2024    AST 24 02/14/2024    ALT 25 02/14/2024    BILT 0.4 02/14/2024    TP 7.1 02/14/2024    ALB 4.5 02/14/2024    GLOBULIN 2.6 02/14/2024    AGRATIO 1.7 02/14/2024     02/14/2024    K 4.5 02/14/2024     02/14/2024    CO2 31 02/14/2024     No results found for: \"PTT\", \"PT\", \"INR\"    Imaging:    3/25/24:    CT A/P reviewed at [facility]:  CONCLUSION:    1. There is moderate sclerosing mesenteritis involving the mesentery diffusely.  There are multiple less than 1 cm short axis dimension lymph nodes noted.  Direct comparison with previous outside studies needed for further evaluation.  There is no bowel   obstruction, free fluid, free air or retroperitoneal lymphadenopathy.    2. Incomplete distention of the stomach, there is retained food material within the antrum of the stomach.     Impression & Plan:     Sclerosing mesenteritis  - management per GI; patient reports symptomatic improvement with dietary changes and weight loss  - discussed less likely malignancy given stability on 3/2024 scan compared to CT in 6/2022  - CT reviewed at [facility]: mesenteric lymph nodes not enlarged, too deep for biopsy.  - given stability of lymph nodes over past 2 years and symptomatic improvement, even less likely to be malignancy  - agree with GI recommendation for surveillance CT in 6 mons; discussed with pt can follow up with GI to obtain CT for 6-12 mos. Surveillance per GI    Giuliano Garsia MD  Hematology/Medical Oncology  [facility]

## 2024-04-16 NOTE — PROGRESS NOTES
Patient here for new consult regarding sclerosing mesenteritis. Patient completed CT scan on 3/24. Patient has been having digestive issues and is being followed by GI. Patient had EGD completed last week. Patient had colonoscopy last year. Patient has no further concerns or complaints at this time.     Education Record    Learner:  Patient    Disease / Diagnosis: new consult     Barriers / Limitations:  None   Comments:    Method:  Discussion   Comments:    General Topics:  Medication, Pain, Side effects and symptom management, and Plan of care reviewed   Comments:    Outcome:  Shows understanding   Comments:

## 2024-07-02 ENCOUNTER — OFFICE VISIT (OUTPATIENT)
Dept: RHEUMATOLOGY | Facility: CLINIC | Age: 44
End: 2024-07-02
Payer: COMMERCIAL

## 2024-07-02 VITALS
WEIGHT: 235.13 LBS | SYSTOLIC BLOOD PRESSURE: 100 MMHG | BODY MASS INDEX: 30.17 KG/M2 | OXYGEN SATURATION: 97 % | RESPIRATION RATE: 16 BRPM | HEART RATE: 69 BPM | HEIGHT: 74 IN | TEMPERATURE: 98 F | DIASTOLIC BLOOD PRESSURE: 70 MMHG

## 2024-07-02 DIAGNOSIS — R76.8 POSITIVE ANA (ANTINUCLEAR ANTIBODY): Primary | ICD-10-CM

## 2024-07-02 DIAGNOSIS — R10.9 ABDOMINAL PAIN, UNSPECIFIED ABDOMINAL LOCATION: ICD-10-CM

## 2024-07-02 DIAGNOSIS — K65.4 MESENTERIC PANNICULITIS (HCC): ICD-10-CM

## 2024-07-02 DIAGNOSIS — R76.8 SCL-70 ANTIBODY POSITIVE: ICD-10-CM

## 2024-07-02 DIAGNOSIS — K21.9 GASTROESOPHAGEAL REFLUX DISEASE WITHOUT ESOPHAGITIS: ICD-10-CM

## 2024-07-02 PROCEDURE — 99204 OFFICE O/P NEW MOD 45 MIN: CPT | Performed by: INTERNAL MEDICINE

## 2024-07-02 NOTE — PROGRESS NOTES
Rheumatology New Patient Note  =====================================================================================================    Date of visit: 7/2/2024  ?  Chief complaint: +GREER, borderline Scl-70  Chief Complaint   Patient presents with    New Patient     New patient referred by his primary care doctor for Scl-70 positive test. Rapid 3 score is a 4.0.     Referring (will send letter)  PCP  NOLA DAY MD  Fax: [phone]  Phone: [phone]    =====================================================================================================  HPI    Nicholas Pace is a 43 year old male     Here for further evaluation of positive GREER, borderline SCL 70 antibody.  Abdominal pain x 2 years. Eventually diagnosed with moderate sclerosing mesenteritis in 2022. Saw hematology, imaging was stable.  Deemed not to have cancer.  Recent EGD/C-scope with Goodrich's esophagus, now on PPI.  Patient has actually D escalated dosing of PPI from 40 mg of omeprazole to 20 mg of omeprazole.  Started gluten free diet.   Has episodic abdominal pain x 4-5 days, unclear if sclerosing mesenteritis is causing his abdominal pain.     GERD- well controlled with PPI  Raynauds- no  Constipation- yes  Skin thickening-no  Oral ulcers- no  Digital ulcers- no  Kidney problems-no  Joint pains/swelling-no     Fhx:  No family history of autoimmune disease.     14 point ROS negative except noted above    Medications:  Current Outpatient Medications on File Prior to Visit   Medication Sig Dispense Refill    omeprazole 20 MG Oral Capsule Delayed Release Take one capsule (20 mg total) by mouth once daily, 30 minutes prior to breakfast. 90 capsule 1    Levomefolate Glucosamine (METHYL-FOLATE OR) Take 400 mcg by mouth daily.      cholecalciferol 50 MCG (2000 UT) Oral Cap Take 1 capsule (2,000 Units total) by mouth daily.      METAMUCIL FIBER OR Take 1 each by mouth daily.      B Complex Vitamins (B COMPLEX 1 OR) Take 1 each by mouth daily.        No current facility-administered medications on file prior to visit.       Past Medical History:  Past Medical History:    Anxiety    very new    Belching    quite a bit    Bloating    Chest pain    Chronic cough    Fatigue    Flatulence/gas pain/belching    Heartburn    always have had - this episode    Indigestion    Loss of appetite    Nausea    Painful swallowing    Problems with swallowing    minimal, but yes    Spitting up blood    Sputum production    Uncomfortable fullness after meals    Vomiting    Vomiting blood    one time    Wears glasses    Weight loss    A little     Past Surgical History:  Past Surgical History:   Procedure Laterality Date    Other      - EXCISION OF SEB CYST ON BACK OF POSTERIOR NECK     Repair rotator cuff,acute Left 2014     Family History:  Family History   Problem Relation Age of Onset    Breast Cancer Mother     Other (Tourette Syndrome) Son     Colon Cancer Paternal Uncle         50 years old    Prostate Cancer Paternal Uncle         51 years old     Social History:  Social History     Socioeconomic History    Marital status:    Tobacco Use    Smoking status: Never    Smokeless tobacco: Never   Vaping Use    Vaping status: Never Used   Substance and Sexual Activity    Alcohol use: Yes     Alcohol/week: 5.0 standard drinks of alcohol     Types: 5 Standard drinks or equivalent per week    Drug use: No   Other Topics Concern    Caffeine Concern Yes    Stress Concern No    Weight Concern No    Special Diet Yes    Exercise Yes    Seat Belt Yes     ?  Allergies:  No Known Allergies      Objective    Vitals:    07/02/24 0840   BP: 100/70   Pulse: 69   Resp: 16   Temp: 98 °F (36.7 °C)   SpO2: 97%   Weight: 235 lb 1.9 oz (106.6 kg)   Height: 6' 2\" (1.88 m)       GEN: NAD, well-nourished.   HEENT: Head: NCAT. Face: No lesions. Eyes: Conjunctiva clear. Sclera are anicteric. PERRLA. EOMs are full. Ears: The right and left ear canals are clear.  Nose: No external or internal  nasal deformities. Nasal septum is midline. Mouth: The lips are within normal limits.  No oral ulcers Tongue is midline with no lesions. The oral cavity is clear.   Neck: Supple. No neck masses. No thyromegaly. No LAD, parotid or submandicular gland palpated.   CV: RRR, no mrg, S1/S2  PULM: CTAB, no wrr, easy effort  Abd: s/nt/nd  Extremities: No cyanosis, edema or deformities.   Neurologic: Strength, CN2-12 grossly intact   Psych: normal affect.   Skin: No lesions or rashes.  -No skin thickening.  Modified Rodnan skin score 0  MSK: 28 joint count performed. No evidence of synovitis in mcp, pip, dip, wrist, elbows, shoulders, hips, knees, ankles, mtp unless otherwise noted. Full ROM of elbows, wrists, knees.    Hands- No ulceration/lesions noted. No swelling in IPJs, no TTP or synovitis noted in joints of hand   Wrists- No pain with wrist flexion and extension. No swelling, erythema, or increased warmth.   Elbows- No swelling, erythema, or increased warmth.   Shoulders- FROM, abduction ~180 degrees bilaterally.    Knees- No swelling, erythema, or increased warmth. AROM flexion/extension ~0-180 degrees.    No valgus/varus laxities appreciated.   Ankles/Feet- No swelling, erythema, or increased warmth.    Labs:      2/2024  CBC W differential WNL  Creatinine 0.97, rest of CMP WNL  TSH WNL  RF, CCP negative  Uric acid 6.2  Sed rate 2  CRP negative  GREER by IFA 1: 320 nuclear dense fine speckled  SCL 70 1.1 which is positive  Rest of extractable nuclear antigens all negative      Lab Results   Component Value Date    WBC 5.5 02/14/2024    RBC 5.55 02/14/2024    HGB 14.6 02/14/2024    HCT 45.3 02/14/2024     02/14/2024    MCV 81.6 02/14/2024    MCH 26.3 (L) 02/14/2024    MCHC 32.2 02/14/2024    RDW 13.4 02/14/2024    NEPRELIM 4.10 03/26/2022    NEPERCENT 54.2 02/14/2024    LYPERCENT 34.7 02/14/2024    MOPERCENT 8.0 02/14/2024    EOPERCENT 2.2 02/14/2024    BAPERCENT 0.9 02/14/2024    NE 2,981 02/14/2024    LYMABS  1,909 02/14/2024    MOABSO 440 02/14/2024    EOABSO 121 02/14/2024    BAABSO 50 02/14/2024     Lab Results   Component Value Date    GLU 89 02/14/2024    BUN 11 02/14/2024    BUNCREA SEE NOTE: 02/14/2024    CREATSERUM 0.97 02/14/2024    ANIONGAP 4 03/26/2022    GFRNAA 104 06/21/2022    GFRAA 121 06/21/2022    CA 9.8 02/14/2024    OSMOCALC 288 03/26/2022    ALKPHO 82 02/14/2024    AST 24 02/14/2024    ALT 25 02/14/2024    BILT 0.4 02/14/2024    TP 7.1 02/14/2024    ALB 4.5 02/14/2024    GLOBULIN 2.6 02/14/2024    AGRATIO 1.7 02/14/2024     02/14/2024    K 4.5 02/14/2024     02/14/2024    CO2 31 02/14/2024         No results found for: \"ANATI\", \"GREER\", \"ANAS\", \"ANASCRN\", \"ANASCRNRFLX\", \"LISA\"  No results found for: \"SSA\", \"SSAUR\", \"ANTISSA\", \"SSA52\", \"SSA60\", \"SSADD\", \"SSB\", \"ANTISSB\"  No results found for: \"DSDNA\", \"ANTIDSDNA\", \"SMUD\", \"ANTISM\", \"SM\", \"RNP\", \"ANTIRNP\", \"SMITHRNP\"  No results found for: \"SCL70\", \"SCL\", \"OVEAHUP68\"  No results found for: \"C3\", \"C4\"  No results found for: \"DRVVT\", \"LAINT\", \"PTTLUPUS\", \"LUPUSINTERP\", \"LA\", \"U1DS6EIPKQ\", \"J6FL0NJFOE\", \"Z8AXBPHEDM\", \"N5YBDSKRUL\"  No results found for: \"CARDIOLIPIGG\", \"CARDIOLIPIGM\", \"CARDIOLIPIGA\", \"CARDIOIGA\", \"CARLIP\"      Additional Labs:    Radiology:    3/2024 CT abd/pelvis    Impression   CONCLUSION:    1. There is moderate sclerosing mesenteritis involving the mesentery diffusely.  There are multiple less than 1 cm short axis dimension lymph nodes noted.  Direct comparison with previous outside studies needed for further evaluation.  There is no bowel  obstruction, free fluid, free air or retroperitoneal lymphadenopathy.  2. Incomplete distention of the stomach, there is retained food material within the antrum of the stomach.     No results found.    Radiology review:      =====================================================================================================  Assessment and Plan    Assessment:  1. Positive GREER (antinuclear  antibody)    2. Mesenteric panniculitis (HCC)    3. Scl-70 antibody positive    4. Abdominal pain, unspecified abdominal location    5. Gastroesophageal reflux disease without esophagitis      Patient with borderline SCL 70 antibody and a moderate titer GREER without clinical features to suggest a systemic autoimmune disease.  No evidence of Raynaud's, puffy digits, sclerodactyly, skin thickening to evoke scleroderma.  -Discussed with the patient the SCL-70 antibody is a borderline titer.  Suspect a false positive.    History of sclerosing mesenteritis of unclear etiology.  Imaging has been stable.  Hematology deemed the issue to be nonmalignant.  -Unclear to me if this imaging finding correlates with the patient's symptoms.  He has no pain or abdominal tenderness today.  He has a known history of Goodrich's esophagus with GERD which may be more likely to be causing the patient's episodic abdominal pain.  ?  Plan:  --[facility] ANAlyzer panel that includes: GREER Screen,IFA, DNA (ds) Antibody, Crithidia IFA with Reflex to Titer, Chromatin, Sm, Sm/RNP Antibody RNP Antibody, Sjogren's Antibodies (SS-A, SS-B), Scleroderma Antibody (Scl-70), Patrica-1 Antibody, Centromere B Antibody, Complement Component C3c and C4c, Cardiolipin Antibodies (IgA, IgG, IgM), Beta-2-Glycoprotein I Antibodies (IgG, IgA, IgM), Rheumatoid Factor (IgA, IgG, IgM), Cyclic Citrullinated Peptide (CCP) Antibody (IgG), 14.3.3 eta Protein, Thyroid Peroxidase Antibodies (TPO)    -ANCA, IgG4 subsets to evaluate for any vasculitis that might be causing sclerosing mesenteritis  -U/A's, UPCR to evaluate for any proteinuria  -Management of abdominal pain/sclerosing mesenteritis per gastroenterology  -Discussed with the patient he could try increasing his PPI to see if this helps with his abdominal pain if it is due to GERD  -Biopsy may provide more definitive answer as to the nature of sclerosing mesenteritis but evidently biopsy would be technically difficult and would  pose some degree of risk.    Diagnoses and all orders for this visit:    Positive GREER (antinuclear antibody)  -     Urinalysis with Culture Reflex  -     Protein,Total,Urine, Random  -     Creatinine, Urine, Random  -     ANCA VASCULITIDES  -     ANAlyzeR GREER, IFA with Reflex Titer/Pattern, Systemic Autoimmune Panel 1 [30451] [Q]  -     Miscellaneous Testing; Future    Mesenteric panniculitis (HCC)  -     Urinalysis with Culture Reflex  -     Protein,Total,Urine, Random  -     Creatinine, Urine, Random  -     ANCA VASCULITIDES  -     ANAlyzeR GREER, IFA with Reflex Titer/Pattern, Systemic Autoimmune Panel 1 [[id number]] [Q]  -     Miscellaneous Testing; Future    Scl-70 antibody positive    Abdominal pain, unspecified abdominal location    Gastroesophageal reflux disease without esophagitis        No follow-ups on file.      The above plan of care, diagnosis, orders, and follow-up were discussed with the patient. Questions related to this recommended plan of care were answered.    Thank you for referring this delightful patient to me. Please feel free to contact me with any questions.     This report was performed utilizing speech recognition software technology. Despite proofreading, speech recognition errors could escape detection. If a word or phrase is confusing or out of context, please do not hesitate to call for   clarification.       Kind regards      Rocio Cordova MD  [facility]

## 2024-07-22 LAB
ANA SCREEN, IFA: POSITIVE
APPEARANCE: CLEAR
B2 GLYCOPROTEIN I (IGA)AB: <2 U/ML
B2 GLYCOPROTEIN I (IGG)AB: <2 U/ML
B2 GLYCOPROTEIN I(IGM)AB: <2 U/ML
BILIRUBIN: NEGATIVE
CARDIOLIPIN AB (IGA): <2 APL-U/ML
CARDIOLIPIN AB (IGG): <2 GPL-U/ML
CARDIOLIPIN AB (IGM): <2 MPL-U/ML
COLOR: YELLOW
COMPLEMENT COMPONENT C3C: 138 MG/DL (ref 82–185)
COMPLEMENT COMPONENT C4C: 39 MG/DL (ref 15–53)
CREATININE, RANDOM URINE: 173 MG/DL (ref 20–320)
CYCLIC CITRULLINATED$PEPTIDE (CCP) AB (IGG): <16 UNITS
DNA AB (DS) CRITHIDIA,IFA: NEGATIVE
GLUCOSE: NEGATIVE
IMMUNOGLOBULIN G SUBCLASS 1: 343 MG/DL (ref 382–929)
IMMUNOGLOBULIN G SUBCLASS 2: 295 MG/DL (ref 241–700)
IMMUNOGLOBULIN G SUBCLASS 3: 77 MG/DL (ref 22–178)
IMMUNOGLOBULIN G SUBCLASS 4: 27.9 MG/DL (ref 4–86)
IMMUNOGLOBULIN G, SERUM: 777 MG/DL (ref 600–1640)
KETONES: NEGATIVE
LEUKOCYTE ESTERASE: NEGATIVE
MUTATED CITRULLINATED VIMENTIN (MCV) AB: <20 U/ML
MYELOPEROXIDASE ANTIBODY: <1 AI
NITRITE: NEGATIVE
OCCULT BLOOD: NEGATIVE
PH: 6.5 (ref 5–8)
PROTEIN, TOTAL, RANDOM UR: 7 MG/DL (ref 5–25)
PROTEIN: NEGATIVE
PROTEINASE-3 ANTIBODY: <1 AI
RHEUMATOID FACTOR (IGA): <5 U
RHEUMATOID FACTOR (IGG): <5 U
RHEUMATOID FACTOR (IGM): <5 U
SPECIFIC GRAVITY: 1.02 (ref 1–1.03)
THYROID PEROXIDASE$ANTIBODIES: 8 IU/ML

## 2024-10-19 ENCOUNTER — HOSPITAL ENCOUNTER (OUTPATIENT)
Dept: CT IMAGING | Age: 44
Discharge: HOME | End: 2024-10-19
Attending: STUDENT IN AN ORGANIZED HEALTH CARE EDUCATION/TRAINING PROGRAM
Payer: COMMERCIAL

## 2024-10-19 DIAGNOSIS — K65.4 SCLEROSING MESENTERITIS (HCC): ICD-10-CM

## 2024-10-19 LAB
CREAT BLD-MCNC: 1 MG/DL
EGFRCR SERPLBLD CKD-EPI 2021: 94 ML/MIN/1.73M2 (ref 60–?)

## 2024-10-19 PROCEDURE — 82565 ASSAY OF CREATININE: CPT

## 2024-10-19 PROCEDURE — 74177 CT ABD & PELVIS W/CONTRAST: CPT | Performed by: STUDENT IN AN ORGANIZED HEALTH CARE EDUCATION/TRAINING PROGRAM

## 2024-10-24 NOTE — PROGRESS NOTES
Refer to [facility] care GI - Dr ORA Townsend   F/u [facility] in 4-6 months with me,     Thank you  Dom      ---    Fabio Peterson,    I wanted to summarize our discussion over the telephone today via this message.      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 As mentioned previously, sclerosing mesenteritis - this is a chronic inflammatory condition that affects your mesentery, which is part of your peritoneum. It sometimes causes fibrosis (scarring) of the tissues.     As we also discussed, I recommend that you see a GI specialist at a tertiary care center, at the [facility], for further evaluation and management of this condition, and additional management options regarding the plan of care. Please also follow up in my local GI clinic in 4-6 months for ongoing care of your symptoms.         Please let me know if you have any questions or concerns.     Sincerely,  Dom Londono MD

## 2025-02-01 ENCOUNTER — V-VISIT (OUTPATIENT)
Dept: URGENT CARE | Age: 45
End: 2025-02-01

## 2025-02-01 VITALS
HEIGHT: 75 IN | RESPIRATION RATE: 18 BRPM | HEART RATE: 79 BPM | SYSTOLIC BLOOD PRESSURE: 112 MMHG | TEMPERATURE: 96.7 F | BODY MASS INDEX: 29.22 KG/M2 | DIASTOLIC BLOOD PRESSURE: 67 MMHG | OXYGEN SATURATION: 97 % | WEIGHT: 235 LBS

## 2025-02-01 DIAGNOSIS — Z20.828 EXPOSURE TO INFLUENZA: ICD-10-CM

## 2025-02-01 DIAGNOSIS — R68.89 FLU-LIKE SYMPTOMS: Primary | ICD-10-CM

## 2025-02-01 DIAGNOSIS — R50.9 FEVER AND CHILLS: ICD-10-CM

## 2025-02-01 LAB
FLUAV AG UPPER RESP QL IA.RAPID: NEGATIVE
FLUBV AG UPPER RESP QL IA.RAPID: NEGATIVE
SARS-COV+SARS-COV-2 AG RESP QL IA.RAPID: NOT DETECTED
TEST LOT EXPIRATION DATE: NORMAL
TEST LOT NUMBER: NORMAL

## 2025-02-01 RX ORDER — OMEPRAZOLE 40 MG/1
1 CAPSULE, DELAYED RELEASE ORAL DAILY
COMMUNITY
Start: 2024-08-12 | End: 2025-08-07

## 2025-02-01 RX ORDER — OSELTAMIVIR PHOSPHATE 75 MG/1
75 CAPSULE ORAL 2 TIMES DAILY
Qty: 10 CAPSULE | Refills: 0 | Status: SHIPPED | OUTPATIENT
Start: 2025-02-01 | End: 2025-02-06

## 2025-02-01 ASSESSMENT — ENCOUNTER SYMPTOMS
VERTIGO: 0
DIZZINESS: 0
ABDOMINAL PAIN: 0
DIAPHORESIS: 0
FEVER: 1
SLEEP DISTURBANCE: 1
WHEEZING: 0
CHANGE IN BOWEL HABIT: 0
CHEST TIGHTNESS: 0
ACTIVITY CHANGE: 0
SINUS PRESSURE: 0
CHILLS: 1
ANOREXIA: 0
HEADACHES: 1
DIARRHEA: 0
APPETITE CHANGE: 0
VOMITING: 0
NAUSEA: 0
COUGH: 1
VISUAL CHANGE: 0
WEAKNESS: 0
FATIGUE: 1
TROUBLE SWALLOWING: 0
SINUS PAIN: 0
SWOLLEN GLANDS: 0
RHINORRHEA: 0
VOICE CHANGE: 0
SHORTNESS OF BREATH: 0
NUMBNESS: 0
SORE THROAT: 0
EYE REDNESS: 0

## 2025-05-07 ENCOUNTER — PATIENT MESSAGE (OUTPATIENT)
Dept: FAMILY MEDICINE CLINIC | Facility: CLINIC | Age: 45
End: 2025-05-07

## (undated) NOTE — LETTER
22    Patient: Tomasz Soares  : 1980 Visit date: 2022    Dear  Alessia Jesus MD    Thank you for referring Tomasz Soares to my practice. Please find my assessment and plan below. Assessment   Mesenteric panniculitis ([address]  (primary encounter diagnosis)  Gastroesophageal reflux disease, unspecified whether esophagitis present  Hiatal hernia  Pancreatic insufficiency  Goodrich's esophagus without dysplasia    Plan   The patient presents for a 6-week follow-up regarding mesenteric panniculitis seen on CT scan in 2022 and GERD symptoms. The patient states since his his last visit, his symptoms have over all improved. He states over the past 3 to 4 days, his belching has increased and so have his reflux symptoms. He attributes this to beer consumption over the weekend. He states for the most part he has been avoiding highly acidic foods. He is taking omeprazole daily in the morning and Pepcid AC in the evenings. He denies nausea or vomiting. The patient states his abdominal pain has improved. He states he has had intermittent abdominal bloating. He is having normal bowel movements. He states since starting the Creon, his bowel movements have regulated. Clinical examination of the abdomen reveals it to be soft, nondistended, nontender, bowel sounds are normal activity normal pitch. There  is no rebounding tenderness or guarding. There are no signs of ascites or peritonitis. The liver and spleen are nonpalpable. There are no palpable masses. There are no umbilical or inguinal hernias. Again, I explained to the patient that the mesenteric panniculitis seen on CT scan in  of this year likely was not causing his abdominal pain and GERD symptoms. I believe his pancreatic insufficiency is what was causing the majority of his symptoms. I emphasized the importance of avoiding the foods on the GERD handout to the patient.     I instructed the patient to take 4 Tums if he plans on consuming any food or beverage items on the GERD handout. If he then begins to have heartburn later in the evening, he should consume for more Tums. I recommend that he increase his omeprazole to 40 mg daily if his symptoms worsen in the future. Additionally, I recommend he start a probiotic. Lastly, we discussed possible surgical intervention in the future. If the patient's symptoms are not resolving with medical management, then I recommend he see Dr. Ashanti Kaufman and discuss surgical treatment options. The patient expressed understanding with the above plan. All questions and concerns were addressed. I have no further follow-up scheduled with this patient at this time.           Sincerely,       Courtney Craig MD   CC: No Recipients

## (undated) NOTE — LETTER
20    Patient: Charu Villafana  : 1980 Visit date: 12/3/2020    Dear  Sara Haynes MD    Thank you for referring Charu Villafana to my practice. Please find my assessment and plan below.       Assessment   Infected sebaceous cyst  (primary

## (undated) NOTE — LETTER
20    Patient: Sharon Tolliver  : 1980 Visit date: 2020    Dear  Maritza Virgen MD    Thank you for referring Sharon Tolliver to my practice. Please find my assessment and plan below.        Assessment   Infected sebaceous cyst  (prima We will perform excisional therapy of this lesion on November 25, 2020. I did warn the patient that it is starting with a slight infection. He has a higher risk of wound infection than normal for a routine excision of a skin lesion.     The benefit of act